# Patient Record
Sex: MALE | Race: ASIAN | NOT HISPANIC OR LATINO | Employment: FULL TIME | URBAN - METROPOLITAN AREA
[De-identification: names, ages, dates, MRNs, and addresses within clinical notes are randomized per-mention and may not be internally consistent; named-entity substitution may affect disease eponyms.]

---

## 2021-09-02 ENCOUNTER — HOSPITAL ENCOUNTER (EMERGENCY)
Facility: HOSPITAL | Age: 26
Discharge: HOME | End: 2021-09-02
Attending: EMERGENCY MEDICINE | Admitting: EMERGENCY MEDICINE
Payer: COMMERCIAL

## 2021-09-02 VITALS
WEIGHT: 225 LBS | RESPIRATION RATE: 18 BRPM | SYSTOLIC BLOOD PRESSURE: 135 MMHG | TEMPERATURE: 98 F | DIASTOLIC BLOOD PRESSURE: 93 MMHG | HEART RATE: 66 BPM | OXYGEN SATURATION: 99 %

## 2021-09-02 DIAGNOSIS — S09.93XA INJURY OF MOUTH, INITIAL ENCOUNTER: Primary | ICD-10-CM

## 2021-09-02 PROCEDURE — 99283 EMERGENCY DEPT VISIT LOW MDM: CPT

## 2021-09-02 PROCEDURE — 99282 EMERGENCY DEPT VISIT SF MDM: CPT | Performed by: EMERGENCY MEDICINE

## 2021-09-03 NOTE — ED PROVIDER NOTES
History  Chief Complaint   Patient presents with    Mouth Injury     bumped his mouth with a drill yesterday  swelling and bruising to lips and inner mouth     23-year-old male states he was using a cordless drill and the bit caught and when that happened it spun the drill around striking him in the left side of his face in the mandibular region  Patient had injury to the mucosa on the inside of his lip and cheek from his teeth  Has had some swelling to the area since that time no fevers chills nausea vomiting diarrhea no bony tenderness of the mandible no missing teeth or fractured teeth that are obvious  History provided by:  Patient and friend   used: No        None       History reviewed  No pertinent past medical history  History reviewed  No pertinent surgical history  History reviewed  No pertinent family history  I have reviewed and agree with the history as documented  E-Cigarette/Vaping    E-Cigarette Use Current Every Day User      E-Cigarette/Vaping Substances    Nicotine Yes     THC No     CBD No     Flavoring Yes     Other No     Unknown No      Social History     Tobacco Use    Smoking status: Never Smoker    Smokeless tobacco: Never Used   Vaping Use    Vaping Use: Every day    Substances: Nicotine, Flavoring   Substance Use Topics    Alcohol use: Not Currently    Drug use: Never       Review of Systems   Constitutional: Negative for activity change, chills, diaphoresis and fever  HENT: Positive for mouth sores  Negative for congestion, ear pain, nosebleeds, sore throat, trouble swallowing and voice change  Eyes: Negative for pain, discharge and redness  Respiratory: Negative for apnea, cough, choking, shortness of breath, wheezing and stridor  Cardiovascular: Negative for chest pain and palpitations  Gastrointestinal: Negative for abdominal distention, abdominal pain, constipation, diarrhea, nausea and vomiting     Endocrine: Negative for polydipsia  Genitourinary: Negative for difficulty urinating, dysuria, flank pain, frequency, hematuria and urgency  Musculoskeletal: Negative for back pain, gait problem, joint swelling, myalgias, neck pain and neck stiffness  Skin: Negative for pallor and rash  Neurological: Negative for dizziness, tremors, syncope, speech difficulty, weakness, numbness and headaches  Hematological: Negative for adenopathy  Psychiatric/Behavioral: Negative for confusion, hallucinations, self-injury and suicidal ideas  The patient is not nervous/anxious  Physical Exam  Physical Exam  Vitals and nursing note reviewed  Constitutional:       General: He is not in acute distress  Appearance: He is well-developed  He is not diaphoretic  HENT:      Head: Normocephalic and atraumatic  Right Ear: External ear normal       Left Ear: External ear normal       Nose: Nose normal       Mouth/Throat:      Comments: Patient does have some nonsuturable lacerations and abrasions to the inside of his left lower lip and cheek  Eyes:      Conjunctiva/sclera: Conjunctivae normal       Pupils: Pupils are equal, round, and reactive to light  Cardiovascular:      Rate and Rhythm: Normal rate and regular rhythm  Heart sounds: Normal heart sounds  Pulmonary:      Effort: Pulmonary effort is normal       Breath sounds: Normal breath sounds  Abdominal:      General: Bowel sounds are normal       Palpations: Abdomen is soft  Musculoskeletal:         General: Normal range of motion  Cervical back: Normal range of motion and neck supple  Skin:     General: Skin is warm and dry  Neurological:      Mental Status: He is alert and oriented to person, place, and time  Deep Tendon Reflexes: Reflexes are normal and symmetric  Psychiatric:         Behavior: Behavior is cooperative           Vital Signs  ED Triage Vitals [09/02/21 1833]   Temperature Pulse Respirations Blood Pressure SpO2   98 °F (36 7 °C) 66 18 135/93 99 %      Temp src Heart Rate Source Patient Position - Orthostatic VS BP Location FiO2 (%)   -- -- -- -- --      Pain Score       6           Vitals:    09/02/21 1833   BP: 135/93   Pulse: 66         Visual Acuity      ED Medications  Medications - No data to display    Diagnostic Studies  Results Reviewed     None                 No orders to display              Procedures  Procedures         ED Course                                           MDM    Disposition  Final diagnoses:   Injury of mouth, initial encounter     Time reflects when diagnosis was documented in both MDM as applicable and the Disposition within this note     Time User Action Codes Description Comment    9/2/2021  7:21 PM Freddy Byrd Add [C04 35EA] Injury of mouth, initial encounter       ED Disposition     ED Disposition Condition Date/Time Comment    Discharge Stable Thu Sep 2, 2021  7:21 PM Dirk Grief discharge to home/self care  Follow-up Information     Follow up With Specialties Details Why Contact Info Additional Information    [address] [facility] Emergency Department Emergency Medicine  As needed [address] [address] Emergency Department, [facility], [facility]          There are no discharge medications for this patient  No discharge procedures on file      PDMP Review     None          ED Provider  Electronically Signed by           Carri Newman DO  09/02/21 2198

## 2022-01-06 ENCOUNTER — OFFICE VISIT (OUTPATIENT)
Dept: URGENT CARE | Facility: CLINIC | Age: 27
End: 2022-01-06
Payer: COMMERCIAL

## 2022-01-06 VITALS
HEIGHT: 70 IN | BODY MASS INDEX: 28.77 KG/M2 | HEART RATE: 86 BPM | TEMPERATURE: 97.1 F | OXYGEN SATURATION: 98 % | RESPIRATION RATE: 16 BRPM | DIASTOLIC BLOOD PRESSURE: 80 MMHG | SYSTOLIC BLOOD PRESSURE: 138 MMHG | WEIGHT: 201 LBS

## 2022-01-06 DIAGNOSIS — Z56.6 OTHER PHYSICAL AND MENTAL STRAIN RELATED TO WORK: Primary | ICD-10-CM

## 2022-01-06 PROCEDURE — 90472 IMMUNIZATION ADMIN EACH ADD: CPT

## 2022-01-06 PROCEDURE — 90471 IMMUNIZATION ADMIN: CPT

## 2022-01-06 PROCEDURE — 90715 TDAP VACCINE 7 YRS/> IM: CPT

## 2022-01-06 PROCEDURE — 90682 RIV4 VACC RECOMBINANT DNA IM: CPT

## 2022-01-06 PROCEDURE — 99203 OFFICE O/P NEW LOW 30 MIN: CPT | Performed by: FAMILY MEDICINE

## 2022-01-06 SDOH — HEALTH STABILITY - MENTAL HEALTH: OTHER PHYSICAL AND MENTAL STRAIN RELATED TO WORK: Z56.6

## 2022-01-06 NOTE — PROGRESS NOTES
[facility]        NAME: Mohammed Boast is a 23 y o  male  : 1995    MRN: [id number]  DATE: 2022  TIME: 1:33 PM    Assessment and Plan   Other physical and mental strain related to work [Z56 6]  1  Other physical and mental strain related to work  influenza vaccine, quadrivalent, recombinant, PF, 0 5 mL, for patients 18 yr+ (FLUBLOK)    Tdap Vaccine greater than or equal to 6yo     Unremarkable physical exam   Given influenza vaccine and Tdap booster  Patient will obtain blood titers  Physical exam form completed and signed  Patient Instructions     Follow up with PCP in 3-5 days  Proceed to  ER if symptoms worsen  Chief Complaint     Chief Complaint   Patient presents with    Annual Exam     Annual exam         History of Present Illness       45-year-old male presents today for a physical prior to his deployment to [place]  Denies any current symptoms or medical ailments  Review of Systems   Review of Systems   Constitutional: Negative for chills and fever  HENT: Negative for congestion, rhinorrhea and sore throat  Respiratory: Negative for cough, shortness of breath and wheezing  Cardiovascular: Negative for chest pain  Gastrointestinal: Negative for abdominal pain and nausea  Musculoskeletal: Negative for arthralgias  Skin: Negative for rash  Neurological: Negative for dizziness and headaches  Current Medications     No current outpatient medications on file  Current Allergies     Allergies as of 2022    (No Known Allergies)            The following portions of the patient's history were reviewed and updated as appropriate: allergies, current medications, past family history, past medical history, past social history, past surgical history and problem list      History reviewed  No pertinent past medical history  History reviewed  No pertinent surgical history  No family history on file        Medications have been verified  Objective   /80   Pulse 86   Temp (!) 97 1 °F (36 2 °C)   Resp 16   Ht 5' 10" (1 778 m)   Wt 91 2 kg (201 lb)   SpO2 98%   BMI 28 84 kg/m²   No LMP for male patient  Physical Exam     Physical Exam  Vitals and nursing note reviewed  Constitutional:       General: He is not in acute distress  Appearance: Normal appearance  He is normal weight  He is not ill-appearing, toxic-appearing or diaphoretic  HENT:      Head: Normocephalic and atraumatic  Right Ear: Tympanic membrane, ear canal and external ear normal  There is no impacted cerumen  Left Ear: Tympanic membrane, ear canal and external ear normal  There is no impacted cerumen  Nose: Nose normal       Mouth/Throat:      Mouth: Mucous membranes are moist       Pharynx: No posterior oropharyngeal erythema  Eyes:      General:         Right eye: No discharge  Left eye: No discharge  Conjunctiva/sclera: Conjunctivae normal    Cardiovascular:      Rate and Rhythm: Normal rate and regular rhythm  Pulmonary:      Effort: Pulmonary effort is normal  No respiratory distress  Breath sounds: Normal breath sounds  No wheezing or rales  Abdominal:      General: Abdomen is flat  Tenderness: There is no abdominal tenderness  Musculoskeletal:         General: No tenderness or signs of injury  Normal range of motion  Cervical back: Normal range of motion  Rigidity present  No tenderness  Lymphadenopathy:      Cervical: No cervical adenopathy  Skin:     General: Skin is warm  Findings: No erythema  Neurological:      General: No focal deficit present  Mental Status: He is alert and oriented to person, place, and time  Cranial Nerves: No cranial nerve deficit  Sensory: No sensory deficit  Motor: No weakness        Coordination: Coordination normal       Gait: Gait normal       Deep Tendon Reflexes: Reflexes normal    Psychiatric:         Mood and Affect: Mood normal          Behavior: Behavior normal          Thought Content:  Thought content normal          Judgment: Judgment normal

## 2022-01-24 ENCOUNTER — OFFICE VISIT (OUTPATIENT)
Dept: FAMILY MEDICINE CLINIC | Facility: CLINIC | Age: 27
End: 2022-01-24

## 2022-01-24 VITALS
HEART RATE: 96 BPM | HEIGHT: 70 IN | RESPIRATION RATE: 18 BRPM | OXYGEN SATURATION: 96 % | TEMPERATURE: 97.7 F | BODY MASS INDEX: 28.72 KG/M2 | SYSTOLIC BLOOD PRESSURE: 132 MMHG | WEIGHT: 200.6 LBS | DIASTOLIC BLOOD PRESSURE: 88 MMHG

## 2022-01-24 DIAGNOSIS — Z02.1 PRE-EMPLOYMENT HEALTH SCREENING EXAMINATION: Primary | ICD-10-CM

## 2022-01-24 DIAGNOSIS — Z13.6 SCREENING FOR CARDIOVASCULAR CONDITION: ICD-10-CM

## 2022-01-24 DIAGNOSIS — Z13.818 ENCOUNTER FOR SCREENING FOR OTHER DIGESTIVE SYSTEM DISORDERS: ICD-10-CM

## 2022-01-24 PROCEDURE — 99213 OFFICE O/P EST LOW 20 MIN: CPT | Performed by: NURSE PRACTITIONER

## 2022-01-24 PROCEDURE — 93000 ELECTROCARDIOGRAM COMPLETE: CPT | Performed by: NURSE PRACTITIONER

## 2022-01-24 PROCEDURE — 3008F BODY MASS INDEX DOCD: CPT | Performed by: NURSE PRACTITIONER

## 2022-01-24 NOTE — PROGRESS NOTES
Assessment/Plan:    1  Pre-employment health screening examination  -     US abdomen limited; Future; Expected date: 01/24/2022    2  Encounter for screening for other digestive system disorders  -     US abdomen limited; Future; Expected date: 01/24/2022    3  Screening for cardiovascular condition  -     POCT ECG        EKG normal   abd us arranged for sat 1/29  Will call pt with results    There are no Patient Instructions on file for this visit  No follow-ups on file  Subjective:      Patient ID: John Ring is a 23 y o  male  Chief Complaint   Patient presents with    other     US gallbladder and EKG for employment in [place]       Patient presents for pre employment EKG  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   Required rigorous medical evaluation since tertiary medical care not available    He is required to have us of gall bladder to r/o gall stone, dz    Reports that he is in very good health  No pmh      The following portions of the patient's history were reviewed and updated as appropriate: allergies, current medications, past family history, past medical history, past social history, past surgical history and problem list     Review of Systems   Constitutional: Negative  Respiratory: Negative  Cardiovascular: Negative  Gastrointestinal: Negative  Neurological: Negative  Psychiatric/Behavioral: Negative  No current outpatient medications on file  No current facility-administered medications for this visit  Objective:    /88 (BP Location: Left arm, Patient Position: Sitting, Cuff Size: Large)   Pulse 96   Temp 97 7 °F (36 5 °C)   Resp 18   Ht 5' 10" (1 778 m)   Wt 91 kg (200 lb 9 6 oz)   SpO2 96%   BMI 28 78 kg/m²        Physical Exam  Vitals and nursing note reviewed  Constitutional:       General: He is not in acute distress  Appearance: Normal appearance  He is not ill-appearing     Cardiovascular:      Rate and Rhythm: Normal rate and regular rhythm  Pulses: Normal pulses  Heart sounds: Normal heart sounds  Pulmonary:      Effort: Pulmonary effort is normal       Breath sounds: Normal breath sounds  Abdominal:      General: Bowel sounds are normal       Palpations: Abdomen is soft  Tenderness: There is no abdominal tenderness  Negative signs include Hoff's sign and McBurney's sign  Neurological:      General: No focal deficit present  Mental Status: He is alert  Psychiatric:         Mood and Affect: Mood normal             EKG: normal EKG, normal sinus rhythm, there are no previous tracings available for comparison        Georgianna Goodell, [place]

## 2022-01-29 ENCOUNTER — HOSPITAL ENCOUNTER (OUTPATIENT)
Dept: ULTRASOUND IMAGING | Facility: HOSPITAL | Age: 27
Discharge: HOME | End: 2022-01-29
Payer: COMMERCIAL

## 2022-01-29 DIAGNOSIS — Z13.818 ENCOUNTER FOR SCREENING FOR OTHER DIGESTIVE SYSTEM DISORDERS: ICD-10-CM

## 2022-01-29 DIAGNOSIS — Z02.1 PRE-EMPLOYMENT HEALTH SCREENING EXAMINATION: ICD-10-CM

## 2022-01-29 PROCEDURE — 76705 ECHO EXAM OF ABDOMEN: CPT

## 2023-10-13 ENCOUNTER — OFFICE VISIT (OUTPATIENT)
Dept: URGENT CARE | Facility: CLINIC | Age: 28
End: 2023-10-13
Payer: COMMERCIAL

## 2023-10-13 VITALS
HEART RATE: 98 BPM | DIASTOLIC BLOOD PRESSURE: 100 MMHG | TEMPERATURE: 97.8 F | WEIGHT: 237.8 LBS | OXYGEN SATURATION: 97 % | HEIGHT: 70 IN | SYSTOLIC BLOOD PRESSURE: 150 MMHG | BODY MASS INDEX: 34.04 KG/M2 | RESPIRATION RATE: 18 BRPM

## 2023-10-13 DIAGNOSIS — J02.9 SORE THROAT: Primary | ICD-10-CM

## 2023-10-13 DIAGNOSIS — K42.9 UMBILICAL HERNIA WITHOUT OBSTRUCTION OR GANGRENE: ICD-10-CM

## 2023-10-13 LAB — S PYO AG THROAT QL: NEGATIVE

## 2023-10-13 PROCEDURE — 99213 OFFICE O/P EST LOW 20 MIN: CPT | Performed by: FAMILY MEDICINE

## 2023-10-13 PROCEDURE — 87147 CULTURE TYPE IMMUNOLOGIC: CPT | Performed by: FAMILY MEDICINE

## 2023-10-13 PROCEDURE — 87070 CULTURE OTHR SPECIMN AEROBIC: CPT | Performed by: FAMILY MEDICINE

## 2023-10-13 PROCEDURE — 87880 STREP A ASSAY W/OPTIC: CPT | Performed by: FAMILY MEDICINE

## 2023-10-13 RX ORDER — FLUTICASONE PROPIONATE 50 MCG
1 SPRAY, SUSPENSION (ML) NASAL DAILY
Qty: 16 G | Refills: 0 | Status: SHIPPED | OUTPATIENT
Start: 2023-10-13

## 2023-10-13 RX ORDER — PENICILLIN V POTASSIUM 500 MG/1
500 TABLET ORAL EVERY 12 HOURS
Qty: 20 TABLET | Refills: 0 | Status: SHIPPED | OUTPATIENT
Start: 2023-10-13 | End: 2023-10-23

## 2023-10-13 NOTE — PROGRESS NOTES
[facility]        NAME: Sylvester Rich is a 33 y.o. male  : 1995    MRN: [id number]  DATE: 2023  TIME: 9:52 AM    Assessment and Plan   Sore throat [J02.9]  1. Sore throat  POCT rapid strepA    Throat culture    penicillin V potassium (VEETID) 500 mg tablet    fluticasone (FLONASE) 50 mcg/act nasal spray      2. Umbilical hernia without obstruction or gangrene  Ambulatory Referral to General Surgery        Negative rapid strep. We will send a throat culture and preemptively treat for strep pharyngitis based on clinical findings. Flonase prescribed to counteract rhinitis and possible postnasal drip. Advised on supportive measures including gargling with warm salt water and hydrating with plenty fluids. Should avoid cold fluids. Small reducible umbilical hernia. Referred to general surgery for further evaluation and management. Patient Instructions     Follow up with PCP in 3-5 days. Proceed to  ER if symptoms worsen. Chief Complaint     Chief Complaint   Patient presents with    Cold Like Symptoms    Sore Throat     X 3 days - sore throat with swollen glands, sl. Nasal congestion/PND with cough. ? Fever several days ago with sl. HA. Taking Tylenol PM and NSS gargles. History of Present Illness     41-year-old male presents today with 3 days of sore throat and odynophagia associated with nasal symptoms, a slight cough and a mild headache. Denies any obvious sick contacts. Has been gargling with warm salt water and taking Tylenol PM.  Also reports feeling a mass in the umbilicus for about 3 months. Review of Systems   Review of Systems   HENT:  Positive for congestion, postnasal drip, sore throat and trouble swallowing. Respiratory:  Positive for cough. Gastrointestinal:  Negative for diarrhea, nausea and vomiting. Neurological:  Positive for headaches.      Current Medications       Current Outpatient Medications:     fluticasone (FLONASE) 50 mcg/act nasal spray, 1 spray into each nostril daily, Disp: 16 g, Rfl: 0    Multiple Vitamin (MULTIVITAMIN ADULT PO), Take by mouth daily after breakfast, Disp: , Rfl:     Omega-3 Fatty Acids (FISH OIL OMEGA-3 PO), Take by mouth daily after breakfast, Disp: , Rfl:     penicillin V potassium (VEETID) 500 mg tablet, Take 1 tablet (500 mg total) by mouth every 12 (twelve) hours for 10 days, Disp: 20 tablet, Rfl: 0    Current Allergies     Allergies as of 10/13/2023    (No Known Allergies)            The following portions of the patient's history were reviewed and updated as appropriate: allergies, current medications, past family history, past medical history, past social history, past surgical history and problem list.     History reviewed. No pertinent past medical history. Past Surgical History:   Procedure Laterality Date    FRACTURE SURGERY Left     hand       History reviewed. No pertinent family history. Medications have been verified. Objective   /100   Pulse 98   Temp 97.8 °F (36.6 °C)   Resp 18   Ht 5' 10" (1.778 m)   Wt 108 kg (237 lb 12.8 oz)   SpO2 97%   BMI 34.12 kg/m²   No LMP for male patient. Physical Exam     Physical Exam  Vitals and nursing note reviewed. Constitutional:       General: He is in acute distress. Appearance: He is well-developed. He is not ill-appearing, toxic-appearing or diaphoretic. HENT:      Head: Normocephalic and atraumatic. Mouth/Throat:      Mouth: Mucous membranes are moist.      Pharynx: Uvula midline. Posterior oropharyngeal erythema present. Tonsils: Tonsillar exudate present. No tonsillar abscesses. 2+ on the right. 2+ on the left. Pulmonary:      Effort: Pulmonary effort is normal.   Abdominal:      Palpations: Abdomen is soft. There is mass. Tenderness: There is no abdominal tenderness.       Hernia: A hernia (Small reducible mass in the umbilicus; ventral wall defect a little less than 1 cm in diameter) is present. Skin:     General: Skin is warm. Findings: No erythema. Neurological:      General: No focal deficit present. Mental Status: He is alert and oriented to person, place, and time.    Psychiatric:         Mood and Affect: Mood normal.         Behavior: Behavior normal.

## 2023-10-14 ENCOUNTER — HOSPITAL ENCOUNTER (EMERGENCY)
Facility: HOSPITAL | Age: 28
Discharge: HOME | End: 2023-10-14
Attending: EMERGENCY MEDICINE
Payer: COMMERCIAL

## 2023-10-14 ENCOUNTER — APPOINTMENT (EMERGENCY)
Dept: RADIOLOGY | Facility: HOSPITAL | Age: 28
End: 2023-10-14
Payer: COMMERCIAL

## 2023-10-14 VITALS
HEART RATE: 88 BPM | TEMPERATURE: 98.2 F | OXYGEN SATURATION: 96 % | DIASTOLIC BLOOD PRESSURE: 90 MMHG | SYSTOLIC BLOOD PRESSURE: 140 MMHG | RESPIRATION RATE: 18 BRPM

## 2023-10-14 DIAGNOSIS — J03.90 ACUTE TONSILLITIS: Primary | ICD-10-CM

## 2023-10-14 DIAGNOSIS — J36 TONSILLAR ABSCESS: ICD-10-CM

## 2023-10-14 LAB
ANION GAP SERPL CALCULATED.3IONS-SCNC: 8 MMOL/L
BASOPHILS # BLD AUTO: 0.03 THOUSANDS/ÂΜL (ref 0–0.1)
BASOPHILS NFR BLD AUTO: 0 % (ref 0–1)
BUN SERPL-MCNC: 12 MG/DL (ref 5–25)
CALCIUM SERPL-MCNC: 9.2 MG/DL (ref 8.4–10.2)
CHLORIDE SERPL-SCNC: 100 MMOL/L (ref 96–108)
CO2 SERPL-SCNC: 26 MMOL/L (ref 21–32)
CREAT SERPL-MCNC: 1.2 MG/DL (ref 0.6–1.3)
EOSINOPHIL # BLD AUTO: 0.36 THOUSAND/ÂΜL (ref 0–0.61)
EOSINOPHIL NFR BLD AUTO: 4 % (ref 0–6)
ERYTHROCYTE [DISTWIDTH] IN BLOOD BY AUTOMATED COUNT: 12.9 % (ref 11.6–15.1)
GFR SERPL CREATININE-BSD FRML MDRD: 82 ML/MIN/1.73SQ M
GLUCOSE SERPL-MCNC: 118 MG/DL (ref 65–140)
HCT VFR BLD AUTO: 49 % (ref 36.5–49.3)
HETEROPH AB SER QL: NEGATIVE
HGB BLD-MCNC: 17.3 G/DL (ref 12–17)
IMM GRANULOCYTES # BLD AUTO: 0.03 THOUSAND/UL (ref 0–0.2)
IMM GRANULOCYTES NFR BLD AUTO: 0 % (ref 0–2)
LYMPHOCYTES # BLD AUTO: 2.16 THOUSANDS/ÂΜL (ref 0.6–4.47)
LYMPHOCYTES NFR BLD AUTO: 23 % (ref 14–44)
MCH RBC QN AUTO: 31.3 PG (ref 26.8–34.3)
MCHC RBC AUTO-ENTMCNC: 35.3 G/DL (ref 31.4–37.4)
MCV RBC AUTO: 89 FL (ref 82–98)
MONOCYTES # BLD AUTO: 1.02 THOUSAND/ÂΜL (ref 0.17–1.22)
MONOCYTES NFR BLD AUTO: 11 % (ref 4–12)
NEUTROPHILS # BLD AUTO: 6 THOUSANDS/ÂΜL (ref 1.85–7.62)
NEUTS SEG NFR BLD AUTO: 62 % (ref 43–75)
NRBC BLD AUTO-RTO: 0 /100 WBCS
PLATELET # BLD AUTO: 240 THOUSANDS/UL (ref 149–390)
PMV BLD AUTO: 10.7 FL (ref 8.9–12.7)
POTASSIUM SERPL-SCNC: 3.5 MMOL/L (ref 3.5–5.3)
RBC # BLD AUTO: 5.52 MILLION/UL (ref 3.88–5.62)
S PYO DNA THROAT QL NAA+PROBE: NOT DETECTED
SODIUM SERPL-SCNC: 134 MMOL/L (ref 135–147)
WBC # BLD AUTO: 9.6 THOUSAND/UL (ref 4.31–10.16)

## 2023-10-14 PROCEDURE — 36415 COLL VENOUS BLD VENIPUNCTURE: CPT | Performed by: EMERGENCY MEDICINE

## 2023-10-14 PROCEDURE — 80048 BASIC METABOLIC PNL TOTAL CA: CPT | Performed by: EMERGENCY MEDICINE

## 2023-10-14 PROCEDURE — 99285 EMERGENCY DEPT VISIT HI MDM: CPT | Performed by: EMERGENCY MEDICINE

## 2023-10-14 PROCEDURE — 85025 COMPLETE CBC W/AUTO DIFF WBC: CPT | Performed by: EMERGENCY MEDICINE

## 2023-10-14 PROCEDURE — 87651 STREP A DNA AMP PROBE: CPT | Performed by: EMERGENCY MEDICINE

## 2023-10-14 PROCEDURE — 70491 CT SOFT TISSUE NECK W/DYE: CPT

## 2023-10-14 PROCEDURE — 86308 HETEROPHILE ANTIBODY SCREEN: CPT | Performed by: EMERGENCY MEDICINE

## 2023-10-14 RX ORDER — AMOXICILLIN AND CLAVULANATE POTASSIUM 875; 125 MG/1; MG/1
1 TABLET, FILM COATED ORAL EVERY 12 HOURS SCHEDULED
Qty: 14 TABLET | Refills: 0 | Status: SHIPPED | OUTPATIENT
Start: 2023-10-14 | End: 2023-10-21

## 2023-10-14 RX ORDER — HYDROMORPHONE HCL/PF 1 MG/ML
1 SYRINGE (ML) INJECTION ONCE
Status: COMPLETED | OUTPATIENT
Start: 2023-10-14 | End: 2023-10-14

## 2023-10-14 RX ORDER — KETOROLAC TROMETHAMINE 30 MG/ML
15 INJECTION, SOLUTION INTRAMUSCULAR; INTRAVENOUS ONCE
Status: COMPLETED | OUTPATIENT
Start: 2023-10-14 | End: 2023-10-14

## 2023-10-14 RX ORDER — FAMOTIDINE 20 MG/1
20 TABLET, FILM COATED ORAL 2 TIMES DAILY
Qty: 10 TABLET | Refills: 0 | Status: SHIPPED | OUTPATIENT
Start: 2023-10-14 | End: 2023-10-19

## 2023-10-14 RX ORDER — METHYLPREDNISOLONE SODIUM SUCCINATE 125 MG/2ML
125 INJECTION, POWDER, LYOPHILIZED, FOR SOLUTION INTRAMUSCULAR; INTRAVENOUS ONCE
Status: COMPLETED | OUTPATIENT
Start: 2023-10-14 | End: 2023-10-14

## 2023-10-14 RX ORDER — NAPROXEN 500 MG/1
500 TABLET ORAL 2 TIMES DAILY WITH MEALS
Qty: 30 TABLET | Refills: 0 | Status: SHIPPED | OUTPATIENT
Start: 2023-10-14 | End: 2023-10-19

## 2023-10-14 RX ORDER — DEXAMETHASONE 4 MG/1
4 TABLET ORAL 2 TIMES DAILY WITH MEALS
Qty: 8 TABLET | Refills: 0 | Status: SHIPPED | OUTPATIENT
Start: 2023-10-14 | End: 2023-10-18

## 2023-10-14 RX ADMIN — SODIUM CHLORIDE 1000 ML: 0.9 INJECTION, SOLUTION INTRAVENOUS at 04:45

## 2023-10-14 RX ADMIN — KETOROLAC TROMETHAMINE 15 MG: 30 INJECTION, SOLUTION INTRAMUSCULAR at 04:45

## 2023-10-14 RX ADMIN — HYDROMORPHONE HYDROCHLORIDE 1 MG: 1 INJECTION, SOLUTION INTRAMUSCULAR; INTRAVENOUS; SUBCUTANEOUS at 06:40

## 2023-10-14 RX ADMIN — METHYLPREDNISOLONE SODIUM SUCCINATE 125 MG: 125 INJECTION, POWDER, FOR SOLUTION INTRAMUSCULAR; INTRAVENOUS at 04:44

## 2023-10-14 RX ADMIN — RACEPINEPHRINE HYDROCHLORIDE 0.5 ML: 11.25 SOLUTION RESPIRATORY (INHALATION) at 04:43

## 2023-10-14 RX ADMIN — SODIUM CHLORIDE 3 G: 9 INJECTION, SOLUTION INTRAVENOUS at 05:31

## 2023-10-14 RX ADMIN — IOHEXOL 100 ML: 350 INJECTION, SOLUTION INTRAVENOUS at 05:07

## 2023-10-14 NOTE — DISCHARGE INSTRUCTIONS
2. Incidentally identified 2.6 x 2.3 cm fluid attenuation structure in the superior mediastinum as described above. Nonemergent cardio thoracic surgical consultation suggested.

## 2023-10-14 NOTE — ED PROVIDER NOTES
Final Diagnosis:  1. Acute tonsillitis    2. Tonsillar abscess        Chief Complaint   Patient presents with    Sore Throat - Complicated     Pt arrives from home, sick over last few days with sore throat and cold symptoms, seen at urgent care and tested for strep and viral illnesses, came back negative, woke tonight because he felt like his throat was very swollen, tonsils enlarged with white patches at time of triage, pt voice altered at time of triage. Prescribed penicillin and nasal spray in office. HPI  Patient presents w/ vocal fullness and sob. He's had a few d of sore throat. He has giant tonsils w/ exudate. They are "kissing". Also w/ cough though. Had negative strep rapid at [facility]. Given penicillin and has had 2 doses. No stridor, not tripoding, but significant hot potatoe voice. Iv established, IV abx and sent straight to CT. Give racemic epi off label for potential in d/c swelling but more importantly IV solumedrol. Has improvement during his 3 hr stay here. Toradol and dilaudid for the discomfort of it    Voice better, breathing better, feels better. EMS reports if applicable: N/A     - Previous charting underwent limited review with attention to last ED visits, labs, ekgs, and prior imaging. Chart review reveals :     Office Visit on 10/13/2023   Component Date Value Ref Range Status     RAPID STREP A 10/13/2023 Negative  Negative Final    Throat Culture 10/13/2023 Culture results to follow. Preliminary       - No language barrier.   - History obtained from patient . - There are n limitations to the history obtained. - Discuss patient's care, with patient permission or by chart review, with      PMH:   has no past medical history on file. PSH:   has a past surgical history that includes Fracture surgery (Left).      Social History:  Tobacco Use: Low Risk  (10/14/2023)    Patient History     Smoking Tobacco Use: Never     Smokeless Tobacco Use: Never     Passive Exposure: Not on file     Alcohol Use: Not on file     No illicit use       ROS:  Pertinent positives/negatives: Kendrick Lennox Some ROS may be present in the HPI and would take precedent over these standard questions asked below. Review of Systems   HENT:  Positive for sore throat, trouble swallowing and voice change. Respiratory:  Positive for cough and shortness of breath. CONSTITUTIONAL:  No lethargy. No weakness. No unexpected weight loss. No appetite change. EYES:  No pain, redness, or discharge. No loss of vision. No orbital trauma or pain. ENT:  No tinnitus or decreased hearing. No epistaxis/purulent rhinorrhea. No trismus. CARDIOVASCULAR:  No chest pain. No skin mottling or pallor. No change in exertional capacity  RESPIRATORY:  No hemoptysis. No paroxysmal nocturnal dyspnea. No stridor. No audible wheezing. No production with cough. GASTROINTESTINAL:  Normal appetite. No vomiting, diarrhea. No pain. No bloating. No melena. No hematochezia. GENITOURINARY:  No frequency, urgency, nocturia. No hematuria or dysuria. No discharge. No sores/adenopathy. MUSCULOSKELETAL:  No arthralgias or myalgias that are new. No new deformity. INTEGUMENTARY:  No swelling. No unexpected contusions. No abrasions. No lymphangitis. NEUROLOGIC:  No meningismus. No new numbness of the extremities. No new focal weakness. No postural instability  PSYCHIATRIC:  No SI HI AVH  HEMATOLOGICAL:  No bleeding. No petechiae. No bruising. ALLERGIES:  No urticaria. No sudden abd cramping. No stridor. PE:     Physical exam highlights:   Physical Exam       Vitals:    10/14/23 0428 10/14/23 0430 10/14/23 0600 10/14/23 0615   BP: 164/91 164/91 140/90    BP Location:  Left arm     Pulse: 98 98 88 88   Resp:  19 18    Temp:       TempSrc:       SpO2:  94% 92% 96%     Vitals reviewed by me. Nursing note reviewed  Chaperone present for all sensitive exam.  Const: No acute distress. Alert. Nontoxic. Not diaphoretic. HEENT: kissing tonsils. Exudate. No uvula deviation. Doesn't examine like peritonsillar abscess in it's symmetry. Eyes: No squinting. No icterus. No tearing/swelling/drainage. Tracks through the room with normal EOM. Neck: ROM normal. No rigidity. No meningismus. Cards: Rate as per vitals Compared to monitor sinus unless documented. Regular Well perfused. Pulm: able to verbalize without additional effort. Effort and excursion normal. No distress. No audible wheezing/ stridor. Normal resp rate without retraction or change in work of breathing. Abd: No distension beyond baseline. No fluctuant wave. Patient without peritoneal pain with shifting/bumping the bed. MSK: ROM normal baseline. No deformity. No contractures from baseline. Skin: small erythamtous sandpaper rash thighs Well perfused. No wounds visualized on exposed skin  Neuro: Nonfocal. Baseline. CN grossly intact. Moving all four with coordination. Psych: Normal behavior and affect. A:  - Nursing note reviewed. Ddx and MDM  Considered diagnoses  R/o bilateral peritonsillar abscess  CT neck w  Has intratonsillar abscess - no drain    Swelling  Airway watch  Watch > 2 hours  Solumedrol  Toradol  Dilaudid  Racemic epi  Feels way better sounds way clearer    Unasyn then augmentin    R/o mono  Given precautions  Did get rash after penicillin    Could just be strep rash or viral exanthem          My conversation with consultant reveals: NA       Decision rules:                           My read of the XR/CT scan reveals:  NA   CT soft tissue neck with contrast   Final Result         1. Symmetrically moderately enlarged [place] tonsils consistent with tonsillitis with small tonsillar abscesses. There is concomitant moderate narrowing of the oropharynx. There is also some enlargement of the posterior soft palate/uvula as well as the    adenoids. 2. Incidentally identified 2.6 x 2.3 cm fluid attenuation structure in the superior mediastinum as described above. Nonemergent cardio thoracic surgical consultation suggested. 3. Additional findings as noted. The study was marked in [facility] for immediate notification.             Workstation performed: [id number]             Orders Placed This Encounter   Procedures    Strep A PCR    CT soft tissue neck with contrast    CBC and differential    Basic metabolic panel    Mononucleosis screen     Labs Reviewed   CBC AND DIFFERENTIAL - Abnormal       Result Value Ref Range Status    WBC 9.60  4.31 - 10.16 Thousand/uL Final    RBC 5.52  3.88 - 5.62 Million/uL Final    Hemoglobin 17.3 (*) 12.0 - 17.0 g/dL Final    Hematocrit 49.0  36.5 - 49.3 % Final    MCV 89  82 - 98 fL Final    MCH 31.3  26.8 - 34.3 pg Final    MCHC 35.3  31.4 - 37.4 g/dL Final    RDW 12.9  11.6 - 15.1 % Final    MPV 10.7  8.9 - 12.7 fL Final    Platelets 284  763 - 390 Thousands/uL Final    nRBC 0  /100 WBCs Final    Neutrophils Relative 62  43 - 75 % Final    Immat GRANS % 0  0 - 2 % Final    Lymphocytes Relative 23  14 - 44 % Final    Monocytes Relative 11  4 - 12 % Final    Eosinophils Relative 4  0 - 6 % Final    Basophils Relative 0  0 - 1 % Final    Neutrophils Absolute 6.00  1.85 - 7.62 Thousands/µL Final    Immature Grans Absolute 0.03  0.00 - 0.20 Thousand/uL Final    Lymphocytes Absolute 2.16  0.60 - 4.47 Thousands/µL Final    Monocytes Absolute 1.02  0.17 - 1.22 Thousand/µL Final    Eosinophils Absolute 0.36  0.00 - 0.61 Thousand/µL Final    Basophils Absolute 0.03  0.00 - 0.10 Thousands/µL Final   BASIC METABOLIC PANEL - Abnormal    Sodium 134 (*) 135 - 147 mmol/L Final    Potassium 3.5  3.5 - 5.3 mmol/L Final    Chloride 100  96 - 108 mmol/L Final    CO2 26  21 - 32 mmol/L Final    ANION GAP 8  mmol/L Final    BUN 12  5 - 25 mg/dL Final    Creatinine 1.20  0.60 - 1.30 mg/dL Final    Comment: Standardized to IDMS reference method    Glucose 118  65 - 140 mg/dL Final    Comment: If the patient is fasting, the ADA then defines impaired fasting glucose as > 100 mg/dL and diabetes as > or equal to 123 mg/dL. Calcium 9.2  8.4 - 10.2 mg/dL Final    eGFR 82  ml/min/1.73sq m Final    Narrative:     [place] guidelines for Chronic Kidney Disease (CKD):     Stage 1 with normal or high GFR (GFR > 90 mL/min/1.73 square meters)    Stage 2 Mild CKD (GFR = 60-89 mL/min/1.73 square meters)    Stage 3A Moderate CKD (GFR = 45-59 mL/min/1.73 square meters)    Stage 3B Moderate CKD (GFR = 30-44 mL/min/1.73 square meters)    Stage 4 Severe CKD (GFR = 15-29 mL/min/1.73 square meters)    Stage 5 End Stage CKD (GFR <15 mL/min/1.73 square meters)  Note: GFR calculation is accurate only with a steady state creatinine   STREP A PCR - Normal    STREP A PCR Not Detected  Not Detected Final       *Each of these labs was reviewed. Particular standout labs will be noted in the ED Course above     Final Diagnosis:  1. Acute tonsillitis    2.  Tonsillar abscess          P:  - hospital tx includes   Medications   sodium chloride 0.9 % bolus 1,000 mL (0 mL Intravenous Stopped 10/14/23 0645)   methylPREDNISolone sodium succinate (Solu-MEDROL) injection 125 mg (125 mg Intravenous Given 10/14/23 0444)   ketorolac (TORADOL) injection 15 mg (15 mg Intravenous Given 10/14/23 0445)   racepinephrine 2.25 % inhalation solution 0.5 mL (0.5 mL Nebulization Given 10/14/23 0443)   ampicillin-sulbactam (UNASYN) 3 g in sodium chloride 0.9 % 100 mL IVPB (0 g Intravenous Stopped 10/14/23 0601)   iohexol (OMNIPAQUE) 350 MG/ML injection (MULTI-DOSE) 100 mL (100 mL Intravenous Given 10/14/23 0507)   HYDROmorphone (DILAUDID) injection 1 mg (1 mg Intravenous Given 10/14/23 0640)         - disposition  Time reflects when diagnosis was documented in both MDM as applicable and the Disposition within this note       Time User Action Codes Description Comment    10/14/2023  6:21 AM Isidro Harrison [J03.90] Acute tonsillitis     10/14/2023  6:21 AM Isidro Harrison [J36] Tonsillar abscess     10/14/2023  6:21 AM Michael Hadley Modify [J36] Tonsillar abscess bilateral    10/14/2023  6:21 AM Michael Hadley Modify [J03.90] Acute tonsillitis bilateral          ED Disposition       ED Disposition   Discharge    Condition   Stable    Date/Time   Sat Oct 14, 2023  6:21 AM    Comment   Wendycorina Goldsteinock discharge to home/self care. Follow-up Information       Follow up With Specialties Details Why Contact Info Additional [address], MD Otolaryngology   [address] [place] [address] [phone]       Imani Kidd MD Otolaryngology, Plastic Surgery   [address] 17[address] [facility] [address] 15[address] [facility] [place] [facility]   [facility] [address]  [address] [facility] [place], [address] [place] [address], [address] [phone]            - patient will call their PCP to let them know they were in the emergency department. We discuss return precautions and patient is agreeable with plan and aformentioned disposition.        - additional treatment intended, if consistent with primary provider:  - patient to follow with :      Discharge Medication List as of 10/14/2023  6:26 AM        START taking these medications    Details   amoxicillin-clavulanate (AUGMENTIN) 875-125 mg per tablet Take 1 tablet by mouth every 12 (twelve) hours for 7 days, Starting Sat 10/14/2023, Until Sat 10/21/2023, Normal      dexamethasone (DECADRON) 4 mg tablet Take 1 tablet (4 mg total) by mouth 2 (two) times a day with meals for 4 days, Starting Sat 10/14/2023, Until Wed 10/18/2023, Normal      famotidine (PEPCID) 20 mg tablet Take 1 tablet (20 mg total) by mouth 2 (two) times a day for 5 days, Starting Sat 10/14/2023, Until Thu 10/19/2023, Normal      menthol-cetylpyridinium (CEPACOL) 3 MG lozenge Take 1 lozenge (3 mg total) by mouth as needed for sore throat, Starting Sat 10/14/2023, Normal      naproxen (Naprosyn) 500 mg tablet Take 1 tablet (500 mg total) by mouth 2 (two) times a day with meals for 5 days, Starting Sat 10/14/2023, Until Thu 10/19/2023, Normal           CONTINUE these medications which have NOT CHANGED    Details   fluticasone (FLONASE) 50 mcg/act nasal spray 1 spray into each nostril daily, Starting Fri 10/13/2023, Normal      Multiple Vitamin (MULTIVITAMIN ADULT PO) Take by mouth daily after breakfast, Historical Med      Omega-3 Fatty Acids (FISH OIL OMEGA-3 PO) Take by mouth daily after breakfast, Historical Med      penicillin V potassium (VEETID) 500 mg tablet Take 1 tablet (500 mg total) by mouth every 12 (twelve) hours for 10 days, Starting Fri 10/13/2023, Until Mon 10/23/2023, Normal           No discharge procedures on file. Prior to Admission Medications   Prescriptions Last Dose Informant Patient Reported? Taking? Multiple Vitamin (MULTIVITAMIN ADULT PO)   Yes No   Sig: Take by mouth daily after breakfast   Omega-3 Fatty Acids (FISH OIL OMEGA-3 PO)   Yes No   Sig: Take by mouth daily after breakfast   fluticasone (FLONASE) 50 mcg/act nasal spray   No No   Si spray into each nostril daily   penicillin V potassium (VEETID) 500 mg tablet   No No   Sig: Take 1 tablet (500 mg total) by mouth every 12 (twelve) hours for 10 days      Facility-Administered Medications: None       Portions of the record may have been created with voice recognition software. Occasional wrong word or "sound a like" substitutions may have occurred due to the inherent limitations of voice recognition software. Read the chart carefully and recognize, using context, where substitutions have occurred.     Electronically signed by:  MD Luciano Nicole MD  10/16/23 9805

## 2023-10-15 ENCOUNTER — HOSPITAL ENCOUNTER (EMERGENCY)
Facility: HOSPITAL | Age: 28
Discharge: HOME | End: 2023-10-15
Attending: EMERGENCY MEDICINE
Payer: COMMERCIAL

## 2023-10-15 VITALS
DIASTOLIC BLOOD PRESSURE: 93 MMHG | TEMPERATURE: 97.5 F | OXYGEN SATURATION: 98 % | SYSTOLIC BLOOD PRESSURE: 151 MMHG | HEART RATE: 94 BPM | RESPIRATION RATE: 20 BRPM

## 2023-10-15 DIAGNOSIS — J03.90 TONSILLITIS: Primary | ICD-10-CM

## 2023-10-15 LAB — BACTERIA THROAT CULT: NORMAL

## 2023-10-15 RX ORDER — KETOROLAC TROMETHAMINE 30 MG/ML
15 INJECTION, SOLUTION INTRAMUSCULAR; INTRAVENOUS ONCE
Status: COMPLETED | OUTPATIENT
Start: 2023-10-15 | End: 2023-10-15

## 2023-10-15 RX ORDER — HYDROCODONE POLISTIREX AND CHLORPHENIRAMINE POLISTIREX 10; 8 MG/5ML; MG/5ML
5 SUSPENSION, EXTENDED RELEASE ORAL EVERY 12 HOURS PRN
Qty: 120 ML | Refills: 0 | Status: SHIPPED | OUTPATIENT
Start: 2023-10-15 | End: 2023-10-25

## 2023-10-15 RX ADMIN — KETOROLAC TROMETHAMINE 15 MG: 30 INJECTION, SOLUTION INTRAMUSCULAR at 09:27

## 2023-10-15 NOTE — ED PROVIDER NOTES
History  Chief Complaint   Patient presents with    Sore Throat     Pt reports of sore throat with diff swallowing worsening for 2 days     32 y/o male presenting for throat pain. Has been seen at urgent care and yesteryda here for similar. Relays he felt better with medication given in the ED hwoever woke up this morning with worsening cough and pain. Has not had worsening swallowing. He is able to swallow without difficulty, no drooling. Was able to take medications and eat breakfast without difficulty. He is on day 2 of antibiotics, not quite 48 hours as of yet. He has been sick for approximately 4 to 5 days. He started with cough and nasal congestion accompanied with a sore throat. Has large tonsils at baseline however does not have a history of mono or strep. Had thorough work-up yesterday. Patient otherwise is now feeling well without any other complaints. He was told to return to the ED for any worsening pain. CT scan shows:  "SUPRAHYOID NECK: Oral cavity, tongue base and epiglottis appear unremarkable. Moderately symmetrically enlarged palatine tonsils are identified contacting each other in the midline, with peripherally enhancing small low-attenuation lesions present in the   medial aspects of each tonsil measuring up to 7 mm on the right and 12 mm on the left. Findings consistent with acute tonsillitis with small peritonsillar abscesses. Moderate narrowing of the oropharynx is identified. There may be some mild thickening   of the soft palate and uvula."          Prior to Admission Medications   Prescriptions Last Dose Informant Patient Reported? Taking?    Multiple Vitamin (MULTIVITAMIN ADULT PO)   Yes No   Sig: Take by mouth daily after breakfast   Omega-3 Fatty Acids (FISH OIL OMEGA-3 PO)   Yes No   Sig: Take by mouth daily after breakfast   amoxicillin-clavulanate (AUGMENTIN) 875-125 mg per tablet   No No   Sig: Take 1 tablet by mouth every 12 (twelve) hours for 7 days   dexamethasone (DECADRON) 4 mg tablet   No No   Sig: Take 1 tablet (4 mg total) by mouth 2 (two) times a day with meals for 4 days   famotidine (PEPCID) 20 mg tablet   No No   Sig: Take 1 tablet (20 mg total) by mouth 2 (two) times a day for 5 days   fluticasone (FLONASE) 50 mcg/act nasal spray   No No   Si spray into each nostril daily   menthol-cetylpyridinium (CEPACOL) 3 MG lozenge   No No   Sig: Take 1 lozenge (3 mg total) by mouth as needed for sore throat   naproxen (Naprosyn) 500 mg tablet   No No   Sig: Take 1 tablet (500 mg total) by mouth 2 (two) times a day with meals for 5 days   penicillin V potassium (VEETID) 500 mg tablet   No No   Sig: Take 1 tablet (500 mg total) by mouth every 12 (twelve) hours for 10 days      Facility-Administered Medications: None       No past medical history on file. Past Surgical History:   Procedure Laterality Date    FRACTURE SURGERY Left     hand       No family history on file. I have reviewed and agree with the history as documented. E-Cigarette/Vaping    E-Cigarette Use Current Every Day User      E-Cigarette/Vaping Substances    Nicotine Yes     THC No     CBD No     Flavoring Yes     Other No     Unknown No      Social History     Tobacco Use    Smoking status: Never    Smokeless tobacco: Never   Vaping Use    Vaping Use: Every day    Substances: Nicotine, Flavoring   Substance Use Topics    Alcohol use: Not Currently    Drug use: Never       Review of Systems   Constitutional: Negative. Negative for chills, fatigue and fever. HENT:  Positive for sore throat and voice change. Negative for congestion, postnasal drip, rhinorrhea and trouble swallowing. Eyes: Negative. Respiratory:  Positive for cough. Negative for shortness of breath and wheezing. Cardiovascular: Negative. Gastrointestinal: Negative. Negative for abdominal pain, diarrhea, nausea and vomiting. Endocrine: Negative. Genitourinary: Negative. Musculoskeletal: Negative. Skin: Negative. Neurological: Negative. Hematological: Negative. Psychiatric/Behavioral: Negative. All other systems reviewed and are negative. Physical Exam  Physical Exam  Vitals and nursing note reviewed. Constitutional:       General: He is not in acute distress. Appearance: Normal appearance. He is well-developed and normal weight. He is not diaphoretic. HENT:      Head: Normocephalic and atraumatic. Right Ear: Tympanic membrane, ear canal and external ear normal.      Left Ear: Tympanic membrane, ear canal and external ear normal.      Nose: Nose normal.      Mouth/Throat:      Pharynx: Oropharyngeal exudate and posterior oropharyngeal erythema present. Comments: Moderate swelling noted to the bilateral tonsillar region without asymmetry is noted there is no uvula deviation no peritonsillar swelling noted. Bilateral tonsillar exudates. Patient resting comfortably no distress speaking full sentences without issues, full sounding voice. Eyes:      General: No scleral icterus. Right eye: No discharge. Left eye: No discharge. Conjunctiva/sclera: Conjunctivae normal.      Pupils: Pupils are equal, round, and reactive to light. Neck:      Comments: Bilateral anterior cervical adenopathy, right more so than the left there is no posterior cervical lymphadenopathy noted. Cardiovascular:      Rate and Rhythm: Normal rate and regular rhythm. Pulses: Normal pulses. Heart sounds: Normal heart sounds. No murmur heard. No friction rub. No gallop. Pulmonary:      Effort: Pulmonary effort is normal. No respiratory distress. Breath sounds: Normal breath sounds. No stridor. No wheezing, rhonchi or rales. Comments: Dry cough noted on exam.  Chest:      Chest wall: No tenderness. Abdominal:      General: Bowel sounds are normal. There is no distension. Palpations: Abdomen is soft. There is no mass. Tenderness: There is no abdominal tenderness.  There is no guarding or rebound. Hernia: No hernia is present. Musculoskeletal:         General: Normal range of motion. Cervical back: Normal range of motion and neck supple. Lymphadenopathy:      Cervical: Cervical adenopathy present. Skin:     General: Skin is warm and dry. Capillary Refill: Capillary refill takes less than 2 seconds. Coloration: Skin is not pale. Findings: No erythema or rash. Neurological:      General: No focal deficit present. Mental Status: He is alert and oriented to person, place, and time. Mental status is at baseline. Psychiatric:         Thought Content: Thought content normal.         Judgment: Judgment normal.         Vital Signs  ED Triage Vitals [10/15/23 0852]   Temperature Pulse Respirations Blood Pressure SpO2   97.5 °F (36.4 °C) 94 20 151/93 98 %      Temp src Heart Rate Source Patient Position - Orthostatic VS BP Location FiO2 (%)   -- Monitor Sitting Right arm --      Pain Score       --           Vitals:    10/15/23 0852   BP: 151/93   Pulse: 94   Patient Position - Orthostatic VS: Sitting         Visual Acuity      ED Medications  Medications   ketorolac (TORADOL) injection 15 mg (has no administration in time range)       Diagnostic Studies  Results Reviewed       None                   No orders to display              Procedures  Procedures         ED Course                                             Medical Decision Making  Had a long conversation with patient regarding lab work and imaging studies performed yesterday. Small tonsillar abscesses noted in the impression as read by the radiologist.  Patient has not had worsening swelling or worsening swallowing, he did however have worsening pain this morning upon waking up with cough. He is not taking anything for cough other than cough drops.   Discussed with patient repeat labs and imaging however deferred, he agrees to wait as he just recently had labs and imaging approximately 24 hours ago and has not had any worsening signs or symptoms of peritonsillar abscess at this point in time. He is also reaching 48 hours of antibiotic therapy. Will treat cough with Tussionex, informed not to drive or hemorrhaging while taking this medication, given education regarding side effects. He is given strict return precautions for any worsening including not limited to difficulty swallowing, facial swelling, worsening swelling, fevers. Otherwise patient agrees to follow-up with his PCP or ENT. Patient and significant other very agreeable to the above assessment and plan. Discussed case with Dr. Randall Ward who is also in agreement with the plan. Risk  OTC drugs. Prescription drug management. Disposition  Final diagnoses: Tonsillitis     Time reflects when diagnosis was documented in both MDM as applicable and the Disposition within this note       Time User Action Codes Description Comment    10/15/2023  9:09 AM Yoni Naranjo Add [A93.55] Tonsillitis           ED Disposition       ED Disposition   Discharge    Condition   Stable    Date/Time   Sun Oct 15, 2023  9:09 AM    Comment   Mariel Smith discharge to home/self care.                    Follow-up Information       Follow up With Specialties Details Why Contact Info Additional [address] [facility] Emergency Medicine Go to  If symptoms worsen such as high fevers, difficulty swallowing, facial swelling, etc, otherwise please follow up with your PCP or ENT [address] [address] [facility], [address] [place] [address], [phone]    Marcia Mcneil MD Otolaryngology Schedule an appointment as soon as possible for a visit in 1 day  [address]               Patient's Medications   Discharge Prescriptions    HYDROCOD JOSE LUIS-CHLORPHE JOSE LUIS ER (TUSSIONEX) 10-8 MG/5 ML ER SUSPENSION    Take 5 mL by mouth every 12 (twelve) hours as needed for cough for up to 10 days Max Daily Amount: 10 mL       Start Date: 10/15/2023End Date: 10/25/2023       Order Dose: 5 mL       Quantity: 120 mL    Refills: 0       No discharge procedures on file.     PDMP Review       None            ED Provider  Electronically Signed by             Abram Brandon PA-C  10/15/23 3980

## 2023-10-18 LAB — BACTERIA THROAT CULT: ABNORMAL

## 2023-11-06 ENCOUNTER — CONSULT (OUTPATIENT)
Dept: SURGERY | Facility: CLINIC | Age: 28
End: 2023-11-06
Payer: COMMERCIAL

## 2023-11-06 VITALS
DIASTOLIC BLOOD PRESSURE: 92 MMHG | OXYGEN SATURATION: 97 % | HEART RATE: 101 BPM | TEMPERATURE: 98.3 F | HEIGHT: 70 IN | BODY MASS INDEX: 35.65 KG/M2 | WEIGHT: 249 LBS | SYSTOLIC BLOOD PRESSURE: 147 MMHG

## 2023-11-06 DIAGNOSIS — K42.9 UMBILICAL HERNIA WITHOUT OBSTRUCTION OR GANGRENE: ICD-10-CM

## 2023-11-06 PROCEDURE — 99243 OFF/OP CNSLTJ NEW/EST LOW 30: CPT | Performed by: SURGERY

## 2023-11-06 RX ORDER — CEFAZOLIN SODIUM 2 G/50ML
2000 SOLUTION INTRAVENOUS ONCE
OUTPATIENT
Start: 2023-11-06 | End: 2023-11-06

## 2023-11-06 NOTE — H&P
[facility] History and Physical Note:    Assessment: Umbilical Hernia without Obstruction or Gangrene     Plan:    Chief Complaint: "bubble around my belly button"     The patient is 33year old male with no PMHx, referral here for Evaluation on Umbilical Hernia without Obstruction or Gangrene. Pt states that "bubble around belly button" has been there about 6 months and it is getting bigger and started to feel pain when lifting heavy object. Pt is . Pt had no history of hernia previously. PMH:  No past medical history on file. PSH:  Past Surgical History:   Procedure Laterality Date    FRACTURE SURGERY Left     hand       Home Meds:  Current Outpatient Medications on File Prior to Visit   Medication Sig Dispense Refill    famotidine (PEPCID) 20 mg tablet Take 1 tablet (20 mg total) by mouth 2 (two) times a day for 5 days 10 tablet 0    fluticasone (FLONASE) 50 mcg/act nasal spray 1 spray into each nostril daily 16 g 0    menthol-cetylpyridinium (CEPACOL) 3 MG lozenge Take 1 lozenge (3 mg total) by mouth as needed for sore throat 18 lozenge 0    Multiple Vitamin (MULTIVITAMIN ADULT PO) Take by mouth daily after breakfast      naproxen (Naprosyn) 500 mg tablet Take 1 tablet (500 mg total) by mouth 2 (two) times a day with meals for 5 days 30 tablet 0    Omega-3 Fatty Acids (FISH OIL OMEGA-3 PO) Take by mouth daily after breakfast       No current facility-administered medications on file prior to visit. Allergies:   Allergies   Allergen Reactions    Shellfish-Derived Products - Food Allergy Anaphylaxis       Social Hx:  Social History     Socioeconomic History    Marital status: Single     Spouse name: Not on file    Number of children: Not on file    Years of education: Not on file    Highest education level: Not on file   Occupational History    Not on file   Tobacco Use    Smoking status: Never    Smokeless tobacco: Never   Vaping Use    Vaping Use: Every day    Substances: Nicotine, Flavoring   Substance and Sexual Activity    Alcohol use: Not Currently    Drug use: Never    Sexual activity: Not on file   Other Topics Concern    Not on file   Social History Narrative    Not on file     Social Determinants of Health     Financial Resource Strain: Not on file   Food Insecurity: Not on file   Transportation Needs: Not on file   Physical Activity: Not on file   Stress: Not on file   Social Connections: Not on file   Intimate Partner Violence: Not on file   Housing Stability: Not on file        Family Hx:    No family history on file. Review of Systems   Constitutional:  Negative for chills and fever. HENT:  Negative for ear pain and sore throat. Eyes:  Negative for pain and visual disturbance. Respiratory:  Negative for cough and shortness of breath. Cardiovascular:  Negative for chest pain and palpitations. Gastrointestinal:  Negative for abdominal pain, constipation, diarrhea, nausea and vomiting. Umbilical Hernia without Obstruction or Gangrene for 6 months    Genitourinary:  Negative for dysuria and hematuria. Musculoskeletal:  Negative for arthralgias, back pain, neck pain and neck stiffness. Skin:  Negative for color change and rash. Neurological:  Negative for dizziness, seizures, syncope, weakness and headaches. Psychiatric/Behavioral:  Negative for agitation, behavioral problems and confusion. The patient is not nervous/anxious. All other systems reviewed and are negative. There were no vitals taken for this visit. Physical Exam  Vitals reviewed. Constitutional:       General: He is not in acute distress. Appearance: Normal appearance. He is not ill-appearing. HENT:      Head: Normocephalic and atraumatic. Right Ear: External ear normal.      Left Ear: External ear normal.      Nose: Nose normal. No congestion or rhinorrhea.       Mouth/Throat:      Mouth: Mucous membranes are moist.   Eyes: Extraocular Movements: Extraocular movements intact. Conjunctiva/sclera: Conjunctivae normal.   Cardiovascular:      Rate and Rhythm: Normal rate and regular rhythm. Pulses: Normal pulses. Heart sounds: Normal heart sounds. No murmur heard. No gallop. Pulmonary:      Effort: Pulmonary effort is normal. No respiratory distress. Breath sounds: Normal breath sounds. No stridor. No wheezing, rhonchi or rales. Chest:      Chest wall: No tenderness. Abdominal:      General: Abdomen is flat. Bowel sounds are normal. There is no distension. Palpations: Abdomen is soft. Tenderness: There is no abdominal tenderness. There is no guarding. Hernia: A hernia is present. Musculoskeletal:         General: No swelling, tenderness or deformity. Normal range of motion. Cervical back: Normal range of motion and neck supple. Right lower leg: No edema. Left lower leg: No edema. Skin:     General: Skin is warm and dry. Capillary Refill: Capillary refill takes less than 2 seconds. Findings: No bruising, erythema, lesion or rash. Neurological:      General: No focal deficit present. Mental Status: He is alert and oriented to person, place, and time. Psychiatric:         Mood and Affect: Mood normal.         Behavior: Behavior normal.         Thought Content:  Thought content normal.       Pertinent labs reviewed    Pertinent images and available reads personally reviewed    Pertinent notes reviewed       Duglas Kovacs MD [id number] [facility]  [phone]

## 2023-11-06 NOTE — H&P (VIEW-ONLY)
[facility] History and Physical Note:    Assessment: Umbilical Hernia without Obstruction or Gangrene     Plan:    Chief Complaint: "bubble around my belly button"     The patient is 33year old male with no PMHx, referral here for Evaluation on Umbilical Hernia without Obstruction or Gangrene. Pt states that "bubble around belly button" has been there about 6 months and it is getting bigger and started to feel pain when lifting heavy object. Pt is . Pt had no history of hernia previously. PMH:  No past medical history on file. PSH:  Past Surgical History:   Procedure Laterality Date    FRACTURE SURGERY Left     hand       Home Meds:  Current Outpatient Medications on File Prior to Visit   Medication Sig Dispense Refill    famotidine (PEPCID) 20 mg tablet Take 1 tablet (20 mg total) by mouth 2 (two) times a day for 5 days 10 tablet 0    fluticasone (FLONASE) 50 mcg/act nasal spray 1 spray into each nostril daily 16 g 0    menthol-cetylpyridinium (CEPACOL) 3 MG lozenge Take 1 lozenge (3 mg total) by mouth as needed for sore throat 18 lozenge 0    Multiple Vitamin (MULTIVITAMIN ADULT PO) Take by mouth daily after breakfast      naproxen (Naprosyn) 500 mg tablet Take 1 tablet (500 mg total) by mouth 2 (two) times a day with meals for 5 days 30 tablet 0    Omega-3 Fatty Acids (FISH OIL OMEGA-3 PO) Take by mouth daily after breakfast       No current facility-administered medications on file prior to visit. Allergies:   Allergies   Allergen Reactions    Shellfish-Derived Products - Food Allergy Anaphylaxis       Social Hx:  Social History     Socioeconomic History    Marital status: Single     Spouse name: Not on file    Number of children: Not on file    Years of education: Not on file    Highest education level: Not on file   Occupational History    Not on file   Tobacco Use    Smoking status: Never    Smokeless tobacco: Never   Vaping Use    Vaping Use: Every day    Substances: Nicotine, Flavoring   Substance and Sexual Activity    Alcohol use: Not Currently    Drug use: Never    Sexual activity: Not on file   Other Topics Concern    Not on file   Social History Narrative    Not on file     Social Determinants of Health     Financial Resource Strain: Not on file   Food Insecurity: Not on file   Transportation Needs: Not on file   Physical Activity: Not on file   Stress: Not on file   Social Connections: Not on file   Intimate Partner Violence: Not on file   Housing Stability: Not on file        Family Hx:    No family history on file. Review of Systems   Constitutional:  Negative for chills and fever. HENT:  Negative for ear pain and sore throat. Eyes:  Negative for pain and visual disturbance. Respiratory:  Negative for cough and shortness of breath. Cardiovascular:  Negative for chest pain and palpitations. Gastrointestinal:  Negative for abdominal pain, constipation, diarrhea, nausea and vomiting. Umbilical Hernia without Obstruction or Gangrene for 6 months    Genitourinary:  Negative for dysuria and hematuria. Musculoskeletal:  Negative for arthralgias, back pain, neck pain and neck stiffness. Skin:  Negative for color change and rash. Neurological:  Negative for dizziness, seizures, syncope, weakness and headaches. Psychiatric/Behavioral:  Negative for agitation, behavioral problems and confusion. The patient is not nervous/anxious. All other systems reviewed and are negative. There were no vitals taken for this visit. Physical Exam  Vitals reviewed. Constitutional:       General: He is not in acute distress. Appearance: Normal appearance. He is not ill-appearing. HENT:      Head: Normocephalic and atraumatic. Right Ear: External ear normal.      Left Ear: External ear normal.      Nose: Nose normal. No congestion or rhinorrhea.       Mouth/Throat:      Mouth: Mucous membranes are moist.   Eyes: Extraocular Movements: Extraocular movements intact. Conjunctiva/sclera: Conjunctivae normal.   Cardiovascular:      Rate and Rhythm: Normal rate and regular rhythm. Pulses: Normal pulses. Heart sounds: Normal heart sounds. No murmur heard. No gallop. Pulmonary:      Effort: Pulmonary effort is normal. No respiratory distress. Breath sounds: Normal breath sounds. No stridor. No wheezing, rhonchi or rales. Chest:      Chest wall: No tenderness. Abdominal:      General: Abdomen is flat. Bowel sounds are normal. There is no distension. Palpations: Abdomen is soft. Tenderness: There is no abdominal tenderness. There is no guarding. Hernia: A hernia is present. Musculoskeletal:         General: No swelling, tenderness or deformity. Normal range of motion. Cervical back: Normal range of motion and neck supple. Right lower leg: No edema. Left lower leg: No edema. Skin:     General: Skin is warm and dry. Capillary Refill: Capillary refill takes less than 2 seconds. Findings: No bruising, erythema, lesion or rash. Neurological:      General: No focal deficit present. Mental Status: He is alert and oriented to person, place, and time. Psychiatric:         Mood and Affect: Mood normal.         Behavior: Behavior normal.         Thought Content:  Thought content normal.       Pertinent labs reviewed    Pertinent images and available reads personally reviewed    Pertinent notes reviewed       Ashlee Turner MD [address] [facility]  [phone]

## 2023-11-06 NOTE — PROGRESS NOTES
[facility] History and Physical Note:    Assessment: Umbilical Hernia without Obstruction or Gangrene     Plan: Symptomatic small umbilical hernia  Schedule outpatient elective repair of umbilical hernia     Chief Complaint: "bubble around my belly button"     The patient is 33year old male with no PMHx, referral here for Evaluation on Umbilical Hernia without Obstruction or Gangrene. Pt states that "bubble around belly button" has been there about 6 months and it is getting bigger and started to feel pain when lifting heavy object. Pt is . Pt had no history of hernia previously. PMH:  History reviewed. No pertinent past medical history. PSH:  Past Surgical History:   Procedure Laterality Date    FRACTURE SURGERY Left     hand       Home Meds:  Current Outpatient Medications on File Prior to Visit   Medication Sig Dispense Refill    Multiple Vitamin (MULTIVITAMIN ADULT PO) Take by mouth daily after breakfast      Omega-3 Fatty Acids (FISH OIL OMEGA-3 PO) Take by mouth daily after breakfast      famotidine (PEPCID) 20 mg tablet Take 1 tablet (20 mg total) by mouth 2 (two) times a day for 5 days 10 tablet 0    fluticasone (FLONASE) 50 mcg/act nasal spray 1 spray into each nostril daily (Patient not taking: Reported on 11/6/2023) 16 g 0    menthol-cetylpyridinium (CEPACOL) 3 MG lozenge Take 1 lozenge (3 mg total) by mouth as needed for sore throat (Patient not taking: Reported on 11/6/2023) 18 lozenge 0    naproxen (Naprosyn) 500 mg tablet Take 1 tablet (500 mg total) by mouth 2 (two) times a day with meals for 5 days 30 tablet 0     No current facility-administered medications on file prior to visit. Allergies:   Allergies   Allergen Reactions    Shellfish-Derived Products - Food Allergy Anaphylaxis       Social Hx:  Social History     Socioeconomic History    Marital status: Single     Spouse name: Not on file    Number of children: Not on file Years of education: Not on file    Highest education level: Not on file   Occupational History    Not on file   Tobacco Use    Smoking status: Never     Passive exposure: Never    Smokeless tobacco: Never   Vaping Use    Vaping Use: Former    Substances: Nicotine, Flavoring   Substance and Sexual Activity    Alcohol use: Not Currently    Drug use: Never    Sexual activity: Not on file   Other Topics Concern    Not on file   Social History Narrative    Not on file     Social Determinants of Health     Financial Resource Strain: Not on file   Food Insecurity: Not on file   Transportation Needs: Not on file   Physical Activity: Not on file   Stress: Not on file   Social Connections: Not on file   Intimate Partner Violence: Not on file   Housing Stability: Not on file        Family Hx:    History reviewed. No pertinent family history. Review of Systems   Constitutional:  Negative for chills and fever. HENT:  Negative for ear pain and sore throat. Eyes:  Negative for pain and visual disturbance. Respiratory:  Negative for cough and shortness of breath. Cardiovascular:  Negative for chest pain and palpitations. Gastrointestinal:  Negative for abdominal pain, constipation, diarrhea, nausea and vomiting. Umbilical Hernia without Obstruction or Gangrene for 6 months    Genitourinary:  Negative for dysuria and hematuria. Musculoskeletal:  Negative for arthralgias, back pain, neck pain and neck stiffness. Skin:  Negative for color change and rash. Neurological:  Negative for dizziness, seizures, syncope, weakness and headaches. Psychiatric/Behavioral:  Negative for agitation, behavioral problems and confusion. The patient is not nervous/anxious. All other systems reviewed and are negative.       /92 (BP Location: Left arm, Patient Position: Sitting, Cuff Size: Large)   Pulse 101   Temp 98.3 °F (36.8 °C) (Core)   Ht 5' 10" (1.778 m)   Wt 113 kg (249 lb)   SpO2 97%   BMI 35.73 kg/m² Physical Exam  Vitals reviewed. Constitutional:       General: He is not in acute distress. Appearance: Normal appearance. He is not ill-appearing. HENT:      Head: Normocephalic and atraumatic. Right Ear: External ear normal.      Left Ear: External ear normal.      Nose: Nose normal. No congestion or rhinorrhea. Mouth/Throat:      Mouth: Mucous membranes are moist.   Eyes:      Extraocular Movements: Extraocular movements intact. Conjunctiva/sclera: Conjunctivae normal.   Cardiovascular:      Rate and Rhythm: Normal rate and regular rhythm. Pulses: Normal pulses. Heart sounds: Normal heart sounds. No murmur heard. No gallop. Pulmonary:      Effort: Pulmonary effort is normal. No respiratory distress. Breath sounds: Normal breath sounds. No stridor. No wheezing, rhonchi or rales. Chest:      Chest wall: No tenderness. Abdominal:      General: Abdomen is flat. Bowel sounds are normal. There is no distension. Palpations: Abdomen is soft. Tenderness: There is no abdominal tenderness. There is no guarding. Hernia: A hernia (supra umbilical hernia, reducible and no strangulation) is present. Musculoskeletal:         General: No swelling, tenderness or deformity. Normal range of motion. Cervical back: Normal range of motion and neck supple. Right lower leg: No edema. Left lower leg: No edema. Skin:     General: Skin is warm and dry. Capillary Refill: Capillary refill takes less than 2 seconds. Findings: No bruising, erythema, lesion or rash. Neurological:      General: No focal deficit present. Mental Status: He is alert and oriented to person, place, and time. Psychiatric:         Mood and Affect: Mood normal.         Behavior: Behavior normal.         Thought Content:  Thought content normal.         Pertinent labs reviewed    Pertinent images and available reads personally reviewed    Pertinent notes reviewed Katheryn Jolley MD [address] [facility]  [phone]

## 2023-11-28 NOTE — PRE-PROCEDURE INSTRUCTIONS
Pre-Surgery Instructions:   Medication Instructions    menthol-cetylpyridinium (CEPACOL) 3 MG lozenge Hold day of surgery. Multiple Vitamin (MULTIVITAMIN ADULT PO) Stop taking 5 days prior to surgery. Omega-3 Fatty Acids (FISH OIL OMEGA-3 PO) Stop taking 5 days prior to surgery. Medication instructions for day surgery reviewed. Please use only a sip of water to take your instructed medications. Avoid all over the counter vitamins, supplements and NSAIDS for one week prior to surgery per anesthesia guidelines. Tylenol is ok to take as needed. You will receive a call one business day prior to surgery with an arrival time and hospital directions. If your surgery is scheduled on a Monday, the hospital will be calling you on the Friday prior to your surgery. If you have not heard from anyone by 8pm, please call the hospital supervisor through the hospital  at [phone]. Katherine Bernal [phone]). Do not eat or drink anything after midnight the night before your surgery, including candy, mints, lifesavers, or chewing gum. Do not drink alcohol 24hrs before your surgery. Try not to smoke at least 24hrs before your surgery. Follow the pre surgery showering instructions as listed in the [facility] Surgical Experience Booklet” or otherwise provided by your surgeon's office. Do not use a blade to shave the surgical area 1 week before surgery. It is okay to use a clean electric clippers up to 24 hours before surgery. Do not apply any lotions, creams, including makeup, cologne, deodorant, or perfumes after showering on the day of your surgery. Do not use dry shampoo, hair spray, hair gel, or any type of hair products. No contact lenses, eye make-up, or artificial eyelashes. Remove nail polish, including gel polish, and any artificial, gel, or acrylic nails if possible. Remove all jewelry including rings and body piercing jewelry. Wear causal clothing that is easy to take on and off.  Consider your type of surgery. Keep any valuables, jewelry, piercings at home. Please bring any specially ordered equipment (sling, braces) if indicated. Arrange for a responsible person to drive you to and from the hospital on the day of your surgery. Visitor Guidelines discussed. Call the surgeon's office with any new illnesses, exposures, or additional questions prior to surgery. Please reference your [facility] Surgical Experience Booklet” for additional information to prepare for your upcoming surgery.

## 2023-11-29 ENCOUNTER — ANESTHESIA EVENT (OUTPATIENT)
Dept: PERIOP | Facility: HOSPITAL | Age: 28
End: 2023-11-29
Payer: COMMERCIAL

## 2023-12-01 ENCOUNTER — HOSPITAL ENCOUNTER (OUTPATIENT)
Facility: HOSPITAL | Age: 28
Setting detail: OUTPATIENT SURGERY
Discharge: HOME | End: 2023-12-01
Attending: SURGERY | Admitting: SURGERY
Payer: COMMERCIAL

## 2023-12-01 ENCOUNTER — ANESTHESIA (OUTPATIENT)
Dept: PERIOP | Facility: HOSPITAL | Age: 28
End: 2023-12-01
Payer: COMMERCIAL

## 2023-12-01 VITALS
OXYGEN SATURATION: 95 % | WEIGHT: 243.39 LBS | SYSTOLIC BLOOD PRESSURE: 127 MMHG | DIASTOLIC BLOOD PRESSURE: 67 MMHG | RESPIRATION RATE: 18 BRPM | HEART RATE: 82 BPM | TEMPERATURE: 97.8 F | HEIGHT: 70 IN | BODY MASS INDEX: 34.84 KG/M2

## 2023-12-01 DIAGNOSIS — K42.9 UMBILICAL HERNIA WITHOUT OBSTRUCTION OR GANGRENE: Primary | ICD-10-CM

## 2023-12-01 PROBLEM — G47.30 SLEEP APNEA: Status: ACTIVE | Noted: 2023-12-01

## 2023-12-01 PROCEDURE — 49591 RPR AA HRN 1ST < 3 CM RDC: CPT | Performed by: SURGERY

## 2023-12-01 PROCEDURE — 49591 RPR AA HRN 1ST < 3 CM RDC: CPT | Performed by: PHYSICIAN ASSISTANT

## 2023-12-01 RX ORDER — ONDANSETRON 2 MG/ML
4 INJECTION INTRAMUSCULAR; INTRAVENOUS ONCE AS NEEDED
Status: DISCONTINUED | OUTPATIENT
Start: 2023-12-01 | End: 2023-12-01 | Stop reason: HOSPADM

## 2023-12-01 RX ORDER — MIDAZOLAM HYDROCHLORIDE 2 MG/2ML
INJECTION, SOLUTION INTRAMUSCULAR; INTRAVENOUS AS NEEDED
Status: DISCONTINUED | OUTPATIENT
Start: 2023-12-01 | End: 2023-12-01

## 2023-12-01 RX ORDER — BUPIVACAINE HYDROCHLORIDE AND EPINEPHRINE 5; 5 MG/ML; UG/ML
INJECTION, SOLUTION PERINEURAL AS NEEDED
Status: DISCONTINUED | OUTPATIENT
Start: 2023-12-01 | End: 2023-12-01 | Stop reason: HOSPADM

## 2023-12-01 RX ORDER — CEFAZOLIN SODIUM 2 G/50ML
2000 SOLUTION INTRAVENOUS ONCE
Status: COMPLETED | OUTPATIENT
Start: 2023-12-01 | End: 2023-12-01

## 2023-12-01 RX ORDER — MAGNESIUM HYDROXIDE 1200 MG/15ML
LIQUID ORAL AS NEEDED
Status: DISCONTINUED | OUTPATIENT
Start: 2023-12-01 | End: 2023-12-01 | Stop reason: HOSPADM

## 2023-12-01 RX ORDER — OXYCODONE HYDROCHLORIDE AND ACETAMINOPHEN 5; 325 MG/1; MG/1
1 TABLET ORAL EVERY 6 HOURS PRN
Qty: 6 TABLET | Refills: 0 | Status: SHIPPED | OUTPATIENT
Start: 2023-12-01

## 2023-12-01 RX ORDER — LIDOCAINE HYDROCHLORIDE 10 MG/ML
INJECTION, SOLUTION EPIDURAL; INFILTRATION; INTRACAUDAL; PERINEURAL AS NEEDED
Status: DISCONTINUED | OUTPATIENT
Start: 2023-12-01 | End: 2023-12-01

## 2023-12-01 RX ORDER — FENTANYL CITRATE 50 UG/ML
INJECTION, SOLUTION INTRAMUSCULAR; INTRAVENOUS AS NEEDED
Status: DISCONTINUED | OUTPATIENT
Start: 2023-12-01 | End: 2023-12-01

## 2023-12-01 RX ORDER — OXYCODONE HYDROCHLORIDE AND ACETAMINOPHEN 5; 325 MG/1; MG/1
1 TABLET ORAL ONCE
Status: COMPLETED | OUTPATIENT
Start: 2023-12-01 | End: 2023-12-01

## 2023-12-01 RX ORDER — SODIUM CHLORIDE, SODIUM LACTATE, POTASSIUM CHLORIDE, CALCIUM CHLORIDE 600; 310; 30; 20 MG/100ML; MG/100ML; MG/100ML; MG/100ML
125 INJECTION, SOLUTION INTRAVENOUS CONTINUOUS
Status: DISCONTINUED | OUTPATIENT
Start: 2023-12-01 | End: 2023-12-01 | Stop reason: HOSPADM

## 2023-12-01 RX ORDER — ONDANSETRON 2 MG/ML
INJECTION INTRAMUSCULAR; INTRAVENOUS AS NEEDED
Status: DISCONTINUED | OUTPATIENT
Start: 2023-12-01 | End: 2023-12-01

## 2023-12-01 RX ORDER — SODIUM CHLORIDE, SODIUM LACTATE, POTASSIUM CHLORIDE, CALCIUM CHLORIDE 600; 310; 30; 20 MG/100ML; MG/100ML; MG/100ML; MG/100ML
20 INJECTION, SOLUTION INTRAVENOUS CONTINUOUS
Status: DISCONTINUED | OUTPATIENT
Start: 2023-12-01 | End: 2023-12-01 | Stop reason: HOSPADM

## 2023-12-01 RX ORDER — KETOROLAC TROMETHAMINE 30 MG/ML
INJECTION, SOLUTION INTRAMUSCULAR; INTRAVENOUS AS NEEDED
Status: DISCONTINUED | OUTPATIENT
Start: 2023-12-01 | End: 2023-12-01

## 2023-12-01 RX ORDER — DEXAMETHASONE SODIUM PHOSPHATE 10 MG/ML
INJECTION, SOLUTION INTRAMUSCULAR; INTRAVENOUS AS NEEDED
Status: DISCONTINUED | OUTPATIENT
Start: 2023-12-01 | End: 2023-12-01

## 2023-12-01 RX ORDER — PROPOFOL 10 MG/ML
INJECTION, EMULSION INTRAVENOUS AS NEEDED
Status: DISCONTINUED | OUTPATIENT
Start: 2023-12-01 | End: 2023-12-01

## 2023-12-01 RX ORDER — FENTANYL CITRATE/PF 50 MCG/ML
50 SYRINGE (ML) INJECTION
Status: DISCONTINUED | OUTPATIENT
Start: 2023-12-01 | End: 2023-12-01 | Stop reason: HOSPADM

## 2023-12-01 RX ADMIN — SODIUM CHLORIDE, SODIUM LACTATE, POTASSIUM CHLORIDE, AND CALCIUM CHLORIDE: .6; .31; .03; .02 INJECTION, SOLUTION INTRAVENOUS at 10:07

## 2023-12-01 RX ADMIN — ONDANSETRON 4 MG: 2 INJECTION INTRAMUSCULAR; INTRAVENOUS at 11:33

## 2023-12-01 RX ADMIN — DEXAMETHASONE SODIUM PHOSPHATE 10 MG: 10 INJECTION, SOLUTION INTRAMUSCULAR; INTRAVENOUS at 11:33

## 2023-12-01 RX ADMIN — FENTANYL CITRATE 50 MCG: 50 INJECTION, SOLUTION INTRAMUSCULAR; INTRAVENOUS at 12:34

## 2023-12-01 RX ADMIN — PROPOFOL 200 MG: 10 INJECTION, EMULSION INTRAVENOUS at 11:24

## 2023-12-01 RX ADMIN — CEFAZOLIN SODIUM 2000 MG: 2 SOLUTION INTRAVENOUS at 11:27

## 2023-12-01 RX ADMIN — LIDOCAINE HYDROCHLORIDE 50 MG: 10 INJECTION, SOLUTION EPIDURAL; INFILTRATION; INTRACAUDAL; PERINEURAL at 11:24

## 2023-12-01 RX ADMIN — FENTANYL CITRATE 50 MCG: 50 INJECTION, SOLUTION INTRAMUSCULAR; INTRAVENOUS at 12:23

## 2023-12-01 RX ADMIN — KETOROLAC TROMETHAMINE 30 MG: 30 INJECTION, SOLUTION INTRAMUSCULAR at 11:51

## 2023-12-01 RX ADMIN — MIDAZOLAM 2 MG: 1 INJECTION INTRAMUSCULAR; INTRAVENOUS at 11:18

## 2023-12-01 RX ADMIN — OXYCODONE HYDROCHLORIDE AND ACETAMINOPHEN 1 TABLET: 5; 325 TABLET ORAL at 13:11

## 2023-12-01 RX ADMIN — FENTANYL CITRATE 100 MCG: 50 INJECTION, SOLUTION INTRAMUSCULAR; INTRAVENOUS at 11:24

## 2023-12-01 NOTE — ANESTHESIA POSTPROCEDURE EVALUATION
Post-Op Assessment Note    CV Status:  Stable  Pain Score: 0    Pain management: adequate       Mental Status:  Sleepy and arousable   Hydration Status:  Stable   PONV Controlled:  None   Airway Patency:  Patent and adequate     Post Op Vitals Reviewed: Yes    No anethesia notable event occurred.     Staff: CRNA               BP      Temp      Pulse     Resp      SpO2

## 2023-12-01 NOTE — OP NOTE
OPERATIVE REPORT  PATIENT NAME: Michelle Maloney    :  1995  MRN: [id number]  Pt Location: [facility] OR ROOM 01    SURGERY DATE: 2023    Surgeon(s) and Role:     * Rachell Pugh MD - Primary     * Leroy Arnold PA-C - Assisting    Preop Diagnosis:  Umbilical hernia without obstruction or gangrene [K42.9]    Post-Op Diagnosis Codes:     * Umbilical hernia without obstruction or gangrene [K42.9]    Procedure(s):  REPAIR HERNIA UMBILICAL    Specimen(s):  * No specimens in log *    Estimated Blood Loss:   Minimal    Drains:  * No LDAs found *    Anesthesia Type:   General    Operative Indications:  Umbilical hernia without obstruction or gangrene [K42.9]      Operative Findings:  Umbilical hernia with 1 cm fascial defect    Complications:   None    Procedure and Technique:  Umbilical hernia repair. Patient was taken back to main operating, placed supine on the operative table, general anesthesia was induced, and the abdomen was prepped and draped in normal fashion. Utilizing a curvilinear incision around the umbilicus, skin was incised. Soft tissue was divided with Bovie cautery. The dermis of the umbilicus was dissected free from a small umbilical hernia sac. The fascial margins were clearly identified. The fascia defect was 1 cm x 1 cm. This was closed with 0 Ethibond figure-of-eight sutures. The area was irrigated and the dermis of the umbilicus was tacked down to the fascial repair. Soft tissue was reapproximated with 3-0 Vicryl. 4-0 Monocryl was used to approximate the skin edges. Exofin was placed as a dressing. Large field block was created at the end of the case with local anesthesia. The patient awoke from general anesthesia, was extubated in the operating room, sent to the PACU in stable condition. CARLOS Arnold was required for technical assistance and retraction   I was present for the entire procedure., A qualified resident physician was not available. , and A physician assistant was required during the procedure for retraction, tissue handling, dissection and suturing.     Patient Disposition:  PACU         SIGNATURE: Anne Alberts MD  DATE: December 1, 2023  TIME: 11:44 AM

## 2023-12-01 NOTE — PERIOPERATIVE NURSING NOTE
Verbal report received from No Guallpa RN. Pt awake, alert, and oriented, assisted to the bathroom. Pt urinated without any difficulty. Will continue to monitor.

## 2023-12-01 NOTE — ANESTHESIA PREPROCEDURE EVALUATION
Procedure:  REPAIR HERNIA UMBILICAL (Abdomen)    Relevant Problems   ANESTHESIA (within normal limits)      CARDIO (within normal limits)      ENDO (within normal limits)      PULMONARY   (+) Sleep apnea        Physical Exam    Airway    Mallampati score: I  TM Distance: >3 FB  Neck ROM: full     Dental   No notable dental hx     Cardiovascular  Rhythm: regular, Rate: normal    Pulmonary   Breath sounds clear to auscultation    Other Findings      Anesthesia Plan  ASA Score- 2     Anesthesia Type- general with ASA Monitors. Additional Monitors:     Airway Plan:            Plan Factors-Exercise tolerance (METS): >4 METS. Chart reviewed. EKG reviewed. Existing labs reviewed. Patient summary reviewed. Patient is not a current smoker. Induction- intravenous. Postoperative Plan- Plan for postoperative opioid use. Planned trial extubation    Informed Consent- Anesthetic plan and risks discussed with patient. I personally reviewed this patient with the CRNA. Discussed and agreed on the Anesthesia Plan with the CRNA. Melinda Nelson

## 2023-12-01 NOTE — DISCHARGE INSTR - AVS FIRST PAGE
1.  Keep incision clean and dry for 2 days. After 2 days, it may get wet in the shower. No dressing is required. 2.  Take ibuprofen, 600 mg, 3 times a day regularly. 3.  Take Percocet, in addition to ibuprofen, if you need further pain control. 4.  If you do not already have an appointment, call my office at [phone] for an appointment to be seen in about 10 to 14 days.

## 2023-12-01 NOTE — PERIOPERATIVE NURSING NOTE
Patient states that he is feeling numbness of tip of his tongue since he woke up. No swelling and discoloration of tongue noted. Dr Sarina Jordan made aware, r Sarina Jordan evaluated the pt, no interventions. Ok to discharge home as per anaesthesia.

## 2023-12-04 ENCOUNTER — TELEPHONE (OUTPATIENT)
Dept: SURGERY | Facility: CLINIC | Age: 28
End: 2023-12-04

## 2023-12-04 NOTE — TELEPHONE ENCOUNTER
Asked to call patient. His main complaint is numbness at the tip of his tongue. I explained that this is of uncertain etiology, but will likely resolve on its own. Instructed him to alternate Tylenol and Motrin for his discomfort.

## 2023-12-11 ENCOUNTER — HOSPITAL ENCOUNTER (EMERGENCY)
Facility: HOSPITAL | Age: 28
Discharge: HOME | End: 2023-12-11
Attending: EMERGENCY MEDICINE
Payer: COMMERCIAL

## 2023-12-11 VITALS
HEIGHT: 70 IN | OXYGEN SATURATION: 96 % | TEMPERATURE: 98.3 F | BODY MASS INDEX: 34.79 KG/M2 | RESPIRATION RATE: 18 BRPM | DIASTOLIC BLOOD PRESSURE: 82 MMHG | SYSTOLIC BLOOD PRESSURE: 145 MMHG | HEART RATE: 119 BPM | WEIGHT: 243 LBS

## 2023-12-11 DIAGNOSIS — T81.31XA DEHISCENCE OF OPERATIVE WOUND, INITIAL ENCOUNTER: Primary | ICD-10-CM

## 2023-12-11 PROCEDURE — 99283 EMERGENCY DEPT VISIT LOW MDM: CPT

## 2023-12-11 PROCEDURE — 99283 EMERGENCY DEPT VISIT LOW MDM: CPT | Performed by: EMERGENCY MEDICINE

## 2023-12-12 NOTE — DISCHARGE INSTRUCTIONS
If you have any spreading redness, warmth, severe pain, persistent bleeding, persistent leakage of pink fluid, return to the emergency department.  We recommend keeping gauze in place until wound scabs over a second time.

## 2023-12-12 NOTE — ED PROVIDER NOTES
History  Chief Complaint   Patient presents with    Post-op Problem     States he had hernia surgery last week and the stitches came out on own and now has an open wound     HPI  Patient is a 28-year-old male presenting for evaluation of surgical wound.  Patient had umbilical hernia repair on 12/1/2023, states that he tolerated the procedure well, denied any significant postsurgical pain in the area however states that today he noted that the wound was open and slightly bleeding.  Patient denies any persistent drainage from the area, denies any purulence, surrounding redness, warmth, fevers, chills.  Prior to Admission Medications   Prescriptions Last Dose Informant Patient Reported? Taking?   Multiple Vitamin (MULTIVITAMIN ADULT PO)   Yes No   Sig: Take by mouth daily after breakfast   Omega-3 Fatty Acids (FISH OIL OMEGA-3 PO)   Yes No   Sig: Take by mouth daily after breakfast   menthol-cetylpyridinium (CEPACOL) 3 MG lozenge   No No   Sig: Take 1 lozenge (3 mg total) by mouth as needed for sore throat   oxyCODONE-acetaminophen (Percocet) 5-325 mg per tablet   No No   Sig: Take 1 tablet by mouth every 6 (six) hours as needed for moderate pain for up to 6 doses Max Daily Amount: 4 tablets      Facility-Administered Medications: None       Past Medical History:   Diagnosis Date    CPAP (continuous positive airway pressure) dependence     Sleep apnea        Past Surgical History:   Procedure Laterality Date    FRACTURE SURGERY Left     hand    VT RPR AA HERNIA 1ST < 3 CM REDUCIBLE N/A 12/1/2023    Procedure: REPAIR HERNIA UMBILICAL;  Surgeon: Severino Vasquez MD;  Location: [facility];  Service: General       History reviewed. No pertinent family history.  I have reviewed and agree with the history as documented.    E-Cigarette/Vaping    E-Cigarette Use Former User      E-Cigarette/Vaping Substances    Nicotine Yes     THC No     CBD No     Flavoring Yes     Other No     Unknown No      Social History     Tobacco Use     Smoking status: Never     Passive exposure: Never    Smokeless tobacco: Never   Vaping Use    Vaping Use: Former    Substances: Nicotine, Flavoring   Substance Use Topics    Alcohol use: Not Currently    Drug use: Never       Review of Systems   Constitutional:  Negative for chills, fatigue and fever.   Gastrointestinal:  Negative for diarrhea, nausea and vomiting.   Musculoskeletal:  Negative for arthralgias and myalgias.   Skin:  Positive for color change and wound. Negative for pallor and rash.   Neurological:  Negative for headaches.   Psychiatric/Behavioral:  Negative for confusion.    All other systems reviewed and are negative.      Physical Exam  Physical Exam  Vitals and nursing note reviewed.   Constitutional:       General: He is not in acute distress.     Appearance: Normal appearance. He is not ill-appearing, toxic-appearing or diaphoretic.      Comments: Well-appearing, nontoxic with nondistressed   HENT:      Head: Normocephalic and atraumatic.      Right Ear: External ear normal.      Left Ear: External ear normal.   Eyes:      General:         Right eye: No discharge.         Left eye: No discharge.   Pulmonary:      Effort: No respiratory distress.   Abdominal:      General: There is no distension.      Comments: Patient with small linear wound just above the umbilicus, appears relatively superficial.  Not actively bleeding.   Musculoskeletal:         General: No deformity.      Cervical back: Normal range of motion.   Skin:     Findings: No lesion or rash.   Neurological:      Mental Status: He is alert and oriented to person, place, and time. Mental status is at baseline.   Psychiatric:         Mood and Affect: Mood and affect normal.         Vital Signs  ED Triage Vitals   Temperature Pulse Respirations Blood Pressure SpO2   12/11/23 2002 12/11/23 2003 12/11/23 2002 12/11/23 2003 12/11/23 2003   98.3 °F (36.8 °C) (!) 119 18 145/82 96 %      Temp src Heart Rate Source Patient Position -  Orthostatic VS BP Location FiO2 (%)   -- -- -- -- --             Pain Score       12/11/23 2002       3           Vitals:    12/11/23 2003   BP: 145/82   Pulse: (!) 119         Visual Acuity      ED Medications  Medications - No data to display    Diagnostic Studies  Results Reviewed       None                   No orders to display              Procedures  Procedures         ED Course                               SBIRT 22yo+      Flowsheet Row Most Recent Value   Initial Alcohol Screen: US AUDIT-C     1. How often do you have a drink containing alcohol? 0 Filed at: 12/11/2023 2002   2. How many drinks containing alcohol do you have on a typical day you are drinking?  0 Filed at: 12/11/2023 2002   3a. Male UNDER 65: How often do you have five or more drinks on one occasion? 0 Filed at: 12/11/2023 2002   3b. FEMALE Any Age, or MALE 65+: How often do you have 4 or more drinks on one occassion? 0 Filed at: 12/11/2023 2002   Audit-C Score 0 Filed at: 12/11/2023 2002   DAVID: How many times in the past year have you...    Used an illegal drug or used a prescription medication for non-medical reasons? Never Filed at: 12/11/2023 2002                      Medical Decision Making  I obtained history from the patient.  I reviewed external medical documentation, specifically the surgical note on 12/1/2023 for patient's umbilical hernia repair which was apparently performed without complication and patient tolerated well.  Patient's exam consistent with small area of postsurgical dehiscence.  Remains superficial without penetration deeper into fascial layers, not bleeding or leaking.  Instructed to observe for any signs or symptoms of infection, continue to keep covered by abdominal pad, provided with reassurance, discharged with return precautions.             Disposition  Final diagnoses:   Dehiscence of operative wound, initial encounter     Time reflects when diagnosis was documented in both MDM as applicable and the  Disposition within this note       Time User Action Codes Description Comment    12/11/2023  9:25 PM Saw Harris Add [T81.31XA] Dehiscence of operative wound, initial encounter           ED Disposition       ED Disposition   Discharge    Condition   Stable    Date/Time   Mon Dec 11, 2023 2124    Comment   Junior Clements discharge to home/self care.                   Follow-up Information       Follow up With Specialties Details Why Contact Info Additional Information    [facility] Emergency [facility] Emergency Medicine  If symptoms worsen [address] [address]  [phone] [facility] Emergency Department, [address], [place], [address]            Patient's Medications   Discharge Prescriptions    No medications on file       No discharge procedures on file.    PDMP Review       None            ED Provider  Electronically Signed by             Saw Harris MD  12/11/23 2132

## 2023-12-14 ENCOUNTER — OFFICE VISIT (OUTPATIENT)
Dept: SURGERY | Facility: CLINIC | Age: 28
End: 2023-12-14

## 2023-12-14 VITALS — TEMPERATURE: 97.3 F | HEIGHT: 70 IN | BODY MASS INDEX: 36.22 KG/M2 | WEIGHT: 253 LBS

## 2023-12-14 DIAGNOSIS — Z09 SURGERY FOLLOW-UP EXAMINATION: Primary | ICD-10-CM

## 2023-12-14 PROCEDURE — 99024 POSTOP FOLLOW-UP VISIT: CPT | Performed by: SURGERY

## 2023-12-14 NOTE — PROGRESS NOTES
Patient is status post umbilical hernia repair 1 December 2023. He is here for routine follow-up. He was seen in the emergency department days ago and there was some superficial sloughing of the skin at the umbilicus but no obvious infection. Patient reports minimal pain and no wound issues. Some mild epidermal necrolysis. No infection. Patient counseled. Daily with soap and water in the shower and leave exposed to the air.   Follow-up as needed

## 2024-02-28 ENCOUNTER — APPOINTMENT (EMERGENCY)
Dept: RADIOLOGY | Facility: HOSPITAL | Age: 29
End: 2024-02-28
Payer: COMMERCIAL

## 2024-02-28 ENCOUNTER — HOSPITAL ENCOUNTER (EMERGENCY)
Facility: HOSPITAL | Age: 29
Discharge: HOME | End: 2024-02-28
Attending: EMERGENCY MEDICINE
Payer: COMMERCIAL

## 2024-02-28 VITALS
WEIGHT: 248 LBS | HEART RATE: 97 BPM | HEIGHT: 70 IN | TEMPERATURE: 97.8 F | SYSTOLIC BLOOD PRESSURE: 159 MMHG | OXYGEN SATURATION: 97 % | DIASTOLIC BLOOD PRESSURE: 98 MMHG | BODY MASS INDEX: 35.5 KG/M2 | RESPIRATION RATE: 20 BRPM

## 2024-02-28 DIAGNOSIS — M25.562 LEFT KNEE PAIN: Primary | ICD-10-CM

## 2024-02-28 PROCEDURE — 99284 EMERGENCY DEPT VISIT MOD MDM: CPT | Performed by: EMERGENCY MEDICINE

## 2024-02-28 PROCEDURE — 99283 EMERGENCY DEPT VISIT LOW MDM: CPT

## 2024-02-28 PROCEDURE — 73564 X-RAY EXAM KNEE 4 OR MORE: CPT

## 2024-02-29 NOTE — ED PROVIDER NOTES
History  Chief Complaint   Patient presents with    Knee Pain     Pt reported left knee pain when he fell and landed on knee. Denies hitting his head.      Patient presents for evaluation of left knee injury.  Patient fell getting down from a truck landing directly onto his left knee.  Denies any head injury or other injury from the fall.  Pain in the anterior knee.      History provided by:  Patient   used: No    Knee Pain      Prior to Admission Medications   Prescriptions Last Dose Informant Patient Reported? Taking?   Multiple Vitamin (MULTIVITAMIN ADULT PO) 2/28/2024  Yes Yes   Sig: Take by mouth daily after breakfast   Omega-3 Fatty Acids (FISH OIL OMEGA-3 PO) 2/28/2024  Yes Yes   Sig: Take by mouth daily after breakfast      Facility-Administered Medications: None       Past Medical History:   Diagnosis Date    CPAP (continuous positive airway pressure) dependence     Sleep apnea        Past Surgical History:   Procedure Laterality Date    FRACTURE SURGERY Left     hand    [place] RPR AA HERNIA 1ST < 3 CM REDUCIBLE N/A 12/1/2023    Procedure: REPAIR HERNIA UMBILICAL;  Surgeon: Severino Vasquez MD;  Location: [facility];  Service: General       History reviewed. No pertinent family history.  I have reviewed and agree with the history as documented.    E-Cigarette/Vaping    E-Cigarette Use Former User      E-Cigarette/Vaping Substances    Nicotine Yes     THC No     CBD No     Flavoring Yes     Other No     Unknown No      Social History     Tobacco Use    Smoking status: Never     Passive exposure: Never    Smokeless tobacco: Never   Vaping Use    Vaping status: Former    Substances: Nicotine, Flavoring   Substance Use Topics    Alcohol use: Not Currently    Drug use: Never       Review of Systems   All other systems reviewed and are negative.      Physical Exam  Physical Exam  Vitals and nursing note reviewed.   Constitutional:       General: He is not in acute distress.  Musculoskeletal:          General: Tenderness present. No deformity. Normal range of motion.        Legs:    Skin:     Capillary Refill: Capillary refill takes less than 2 seconds.      Findings: No rash.   Neurological:      General: No focal deficit present.      Mental Status: He is alert and oriented to person, place, and time.         Vital Signs  ED Triage Vitals [02/28/24 2122]   Temperature Pulse Respirations Blood Pressure SpO2   97.8 °F (36.6 °C) 97 20 159/98 97 %      Temp Source Heart Rate Source Patient Position - Orthostatic VS BP Location FiO2 (%)   Tympanic Monitor Sitting Right arm --      Pain Score       8           Vitals:    02/28/24 2122   BP: 159/98   Pulse: 97   Patient Position - Orthostatic VS: Sitting         Visual Acuity      ED Medications  Medications - No data to display    Diagnostic Studies  Results Reviewed       None                   XR knee 4+ views LEFT    (Results Pending)              Procedures  Procedures         ED Course                                             Medical Decision Making  Pulse ox 97% on room air indicating adequate oxygenation.  Xray L knee: No fracture or dislocation as read by me      Patient was able to ambulate on his own and declined crutches.  Given orthopedic follow-up to ensure resolution of his symptoms.    Amount and/or Complexity of Data Reviewed  Radiology: ordered and independent interpretation performed.             Disposition  Final diagnoses:   Left knee pain     Time reflects when diagnosis was documented in both MDM as applicable and the Disposition within this note       Time User Action Codes Description Comment    2/28/2024  9:44 PM Francisco Haywood [M25.562] Left knee pain           ED Disposition       ED Disposition   Discharge    Condition   Stable    Date/Time   Wed Feb 28, 2024  9:44 PM    Comment   Junior Clements discharge to home/self care.                   Follow-up Information       Follow up With Specialties Details Why Contact Francoise Rodriguez  Sophia Workman MD Orthopedic Surgery, Hand Surgery In 1 week As needed [address], [address]  [facility] [address] [phone]              Discharge Medication List as of 2/28/2024  9:44 PM        CONTINUE these medications which have NOT CHANGED    Details   Multiple Vitamin (MULTIVITAMIN ADULT PO) Take by mouth daily after breakfast, Historical Med      Omega-3 Fatty Acids (FISH OIL OMEGA-3 PO) Take by mouth daily after breakfast, Historical Med             No discharge procedures on file.    PDMP Review       None            ED Provider  Electronically Signed by             Francisco Haywood DO  02/28/24 1229

## 2024-06-14 ENCOUNTER — OFFICE VISIT (OUTPATIENT)
Dept: URGENT CARE | Facility: CLINIC | Age: 29
End: 2024-06-14
Payer: COMMERCIAL

## 2024-06-14 VITALS
RESPIRATION RATE: 18 BRPM | BODY MASS INDEX: 35.16 KG/M2 | HEART RATE: 84 BPM | SYSTOLIC BLOOD PRESSURE: 120 MMHG | TEMPERATURE: 96.9 F | HEIGHT: 70 IN | OXYGEN SATURATION: 96 % | DIASTOLIC BLOOD PRESSURE: 80 MMHG | WEIGHT: 245.6 LBS

## 2024-06-14 DIAGNOSIS — S61.259A CAT BITE OF FINGER, INITIAL ENCOUNTER: Primary | ICD-10-CM
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
## 2024-06-14 PROCEDURE — 99213 OFFICE O/P EST LOW 20 MIN: CPT | Performed by: FAMILY MEDICINE

## 2024-06-14 RX ORDER — AMOXICILLIN AND CLAVULANATE POTASSIUM 875; 125 MG/1; MG/1
1 TABLET, FILM COATED ORAL EVERY 12 HOURS SCHEDULED
Qty: 10 TABLET | Refills: 0 | Status: SHIPPED | OUTPATIENT
Start: 2024-06-14 | End: 2024-06-19

## 2024-06-14 NOTE — PROGRESS NOTES
[facility]        NAME: Junior Clements is a 29 y.o. male  : 1995    MRN: [id number]  DATE: 2024  TIME: 11:18 AM    Assessment and Plan   Cat bite of finger, initial encounter [S61.259A, W55.01XA]  1. Cat bite of finger, initial encounter  amoxicillin-clavulanate (AUGMENTIN) 875-125 mg per tablet        Tetanus up-to-date.  Augmentin prescribed for 1 week.  Cat reportedly vaccinated against rabies.    Patient Instructions     Follow up with PCP in 3-5 days.  Proceed to  ER if symptoms worsen.    If tests have been performed at [facility], our office will contact you with results if changes need to be made to the care plan discussed with you at the visit.  [facility]    Chief Complaint     Chief Complaint   Patient presents with    Animal Bite     Yesterday - was helping to put a cat into the carrier (shop cat) and was bit L pinky. Bite marks top and bottom. Used chlorhexidine soap and water to cleanse area. Today finger is sl swollen and tender. No draiange. States cat UTD on vaccines. Last Tdap .          History of Present Illness       29-year-old left-hand-dominant male presents today due to a left pinky bite sustained yesterday afternoon.  Reports that the cat is vaccinated and did not exhibit any abnormal behavior.  The bite occurred while the patient was trying to carry the cat into his cat carrier.  Denies any systemic symptoms, but is experiencing some stiffness of the left pinky finger.  Tdap given 2 years ago.      Review of Systems   Review of Systems   Constitutional:  Negative for chills and fever.   Respiratory:  Negative for cough and shortness of breath.    Cardiovascular:  Negative for chest pain.   Gastrointestinal:  Negative for abdominal pain and nausea.   Musculoskeletal:  Positive for joint swelling.   Skin:  Positive for wound. Negative for color change.   Neurological:  Negative for dizziness and headaches.     Current  "Medications       Current Outpatient Medications:     amoxicillin-clavulanate (AUGMENTIN) 875-125 mg per tablet, Take 1 tablet by mouth every 12 (twelve) hours for 5 days, Disp: 10 tablet, Rfl: 0    Multiple Vitamin (MULTIVITAMIN ADULT PO), Take by mouth daily after breakfast, Disp: , Rfl:     Omega-3 Fatty Acids (FISH OIL OMEGA-3 PO), Take by mouth daily after breakfast, Disp: , Rfl:     Current Allergies     Allergies as of 06/14/2024 - Reviewed 06/14/2024   Allergen Reaction Noted    Shellfish-derived products - food allergy Anaphylaxis 10/14/2023            The following portions of the patient's history were reviewed and updated as appropriate: allergies, current medications, past family history, past medical history, past social history, past surgical history and problem list.     Past Medical History:   Diagnosis Date    CPAP (continuous positive airway pressure) dependence     Sleep apnea        Past Surgical History:   Procedure Laterality Date    FRACTURE SURGERY Left     hand    AR RPR AA HERNIA 1ST < 3 CM REDUCIBLE N/A 12/1/2023    Procedure: REPAIR HERNIA UMBILICAL;  Surgeon: Severino Vasquez MD;  Location: [facility];  Service: General       No family history on file.      Medications have been verified.        Objective   /80   Pulse 84   Temp (!) 96.9 °F (36.1 °C)   Resp 18   Ht 5' 10\" (1.778 m)   Wt 111 kg (245 lb 9.6 oz)   SpO2 96%   BMI 35.24 kg/m²   No LMP for male patient.       Physical Exam     Physical Exam  Vitals and nursing note reviewed.   Constitutional:       General: He is in acute distress.      Appearance: Normal appearance. He is not ill-appearing, toxic-appearing or diaphoretic.   HENT:      Head: Normocephalic and atraumatic.   Eyes:      General:         Right eye: No discharge.         Left eye: No discharge.      Conjunctiva/sclera: Conjunctivae normal.   Pulmonary:      Effort: Pulmonary effort is normal.   Musculoskeletal:         General: Tenderness (Some " tenderness over the left pinky DIP.) and signs of injury present.   Skin:     General: Skin is warm.      Findings: Lesion (3 small scabbed over bite sites on the left pinky.) present.   Neurological:      General: No focal deficit present.      Mental Status: He is alert and oriented to person, place, and time.   Psychiatric:         Mood and Affect: Mood normal.         Behavior: Behavior normal.         Thought Content: Thought content normal.         Judgment: Judgment normal.

## 2024-11-30 ENCOUNTER — OFFICE VISIT (OUTPATIENT)
Dept: URGENT CARE | Facility: CLINIC | Age: 29
End: 2024-11-30
Payer: COMMERCIAL

## 2024-11-30 VITALS
HEIGHT: 71 IN | SYSTOLIC BLOOD PRESSURE: 138 MMHG | OXYGEN SATURATION: 98 % | WEIGHT: 255 LBS | DIASTOLIC BLOOD PRESSURE: 86 MMHG | TEMPERATURE: 99.9 F | HEART RATE: 109 BPM | RESPIRATION RATE: 18 BRPM | BODY MASS INDEX: 35.7 KG/M2

## 2024-11-30 DIAGNOSIS — R68.89 FLU-LIKE SYMPTOMS: Primary | ICD-10-CM

## 2024-11-30 PROCEDURE — 99213 OFFICE O/P EST LOW 20 MIN: CPT | Performed by: FAMILY MEDICINE

## 2024-11-30 PROCEDURE — 87636 SARSCOV2 & INF A&B AMP PRB: CPT | Performed by: FAMILY MEDICINE

## 2024-11-30 RX ORDER — OSELTAMIVIR PHOSPHATE 75 MG/1
75 CAPSULE ORAL EVERY 12 HOURS SCHEDULED
Qty: 10 CAPSULE | Refills: 0 | Status: SHIPPED | OUTPATIENT
Start: 2024-11-30 | End: 2024-12-05

## 2024-11-30 NOTE — PROGRESS NOTES
[facility]        NAME: Junior Clements is a 29 y.o. male  : 1995    MRN: [id number]  DATE: 2024  TIME: 10:35 AM    Assessment and Plan   Flu-like symptoms [R68.89]  1. Flu-like symptoms  Covid/Flu-Office Collect    oseltamivir (TAMIFLU) 75 mg capsule        COVID and flu PCR pending.  Will empirically treat with Tamiflu.  Patient advised on hydrating with plenty of fluids and gargling with warm salt water.  Also recommend taking Tylenol around-the-clock for the next 2 days to counteract generalized myalgias.    Patient Instructions     Follow up with PCP in 3-5 days.  Proceed to  ER if symptoms worsen.    If tests have been performed at [facility], our office will contact you with results if changes need to be made to the care plan discussed with you at the visit.  You can review your full results on [facility].    Chief Complaint     Chief Complaint   Patient presents with    Cold Like Symptoms     Pt states he has a sore throat , bodyaches, and fatigue that started yesterday . Pt took advil las tnight         History of Present Illness       29-year-old male presents today with flulike symptoms which started yesterday.  Reports that his girlfriend's dad was sick prior to the onset of his symptoms.      Review of Systems   Review of Systems   Constitutional:  Positive for chills, diaphoresis and fatigue. Negative for fever.   HENT:  Positive for congestion and rhinorrhea.    Respiratory:  Negative for cough, chest tightness and shortness of breath.    Cardiovascular:  Negative for chest pain.   Gastrointestinal:  Negative for abdominal pain and nausea.   Musculoskeletal:  Positive for arthralgias and myalgias.   Neurological:  Positive for dizziness and headaches (mild).     Current Medications       Current Outpatient Medications:     oseltamivir (TAMIFLU) 75 mg capsule, Take 1 capsule (75 mg total) by mouth every 12 (twelve) hours for 5 days, Disp: 10 capsule, Rfl: 0     "Multiple Vitamin (MULTIVITAMIN ADULT PO), Take by mouth daily after breakfast, Disp: , Rfl:     Omega-3 Fatty Acids (FISH OIL OMEGA-3 PO), Take by mouth daily after breakfast, Disp: , Rfl:     Current Allergies     Allergies as of 11/30/2024 - Reviewed 11/30/2024   Allergen Reaction Noted    Shellfish-derived products - food allergy Anaphylaxis 10/14/2023            The following portions of the patient's history were reviewed and updated as appropriate: allergies, current medications, past family history, past medical history, past social history, past surgical history and problem list.     Past Medical History:   Diagnosis Date    CPAP (continuous positive airway pressure) dependence     Sleep apnea        Past Surgical History:   Procedure Laterality Date    FRACTURE SURGERY Left     hand    [place] RPR AA HERNIA 1ST < 3 CM REDUCIBLE N/A 12/1/2023    Procedure: REPAIR HERNIA UMBILICAL;  Surgeon: Severino Vasquez MD;  Location: [facility];  Service: General       History reviewed. No pertinent family history.      Medications have been verified.        Objective   /86   Pulse (!) 109   Temp 99.9 °F (37.7 °C)   Resp 18   Ht 5' 11\" (1.803 m)   Wt 116 kg (255 lb)   SpO2 98%   BMI 35.57 kg/m²   No LMP for male patient.       Physical Exam     Physical Exam  Vitals and nursing note reviewed.   Constitutional:       General: He is in acute distress.      Appearance: Normal appearance. He is normal weight. He is not ill-appearing, toxic-appearing or diaphoretic.   HENT:      Head: Normocephalic and atraumatic.      Nose: Congestion and rhinorrhea present.      Comments: Inflamed nasal mucosa     Mouth/Throat:      Mouth: Mucous membranes are moist.      Pharynx: No posterior oropharyngeal erythema.   Eyes:      General:         Right eye: No discharge.         Left eye: No discharge.      Conjunctiva/sclera: Conjunctivae normal.   Cardiovascular:      Rate and Rhythm: Regular rhythm. Tachycardia present. "   Pulmonary:      Effort: Pulmonary effort is normal. No respiratory distress.      Breath sounds: Normal breath sounds. No wheezing, rhonchi or rales.   Skin:     General: Skin is warm.      Findings: No erythema.   Neurological:      General: No focal deficit present.      Mental Status: He is alert and oriented to person, place, and time.   Psychiatric:         Mood and Affect: Mood normal.         Behavior: Behavior normal.         Thought Content: Thought content normal.         Judgment: Judgment normal.

## 2024-12-02 LAB
FLUAV RNA RESP QL NAA+PROBE: NEGATIVE
FLUBV RNA RESP QL NAA+PROBE: NEGATIVE
SARS-COV-2 RNA RESP QL NAA+PROBE: NEGATIVE

## 2024-12-03 ENCOUNTER — OFFICE VISIT (OUTPATIENT)
Dept: URGENT CARE | Facility: CLINIC | Age: 29
End: 2024-12-03
Payer: COMMERCIAL

## 2024-12-03 VITALS
HEIGHT: 71 IN | RESPIRATION RATE: 18 BRPM | DIASTOLIC BLOOD PRESSURE: 88 MMHG | HEART RATE: 100 BPM | SYSTOLIC BLOOD PRESSURE: 130 MMHG | OXYGEN SATURATION: 97 % | TEMPERATURE: 98.2 F | BODY MASS INDEX: 35.57 KG/M2

## 2024-12-03 DIAGNOSIS — R05.1 ACUTE COUGH: ICD-10-CM

## 2024-12-03 DIAGNOSIS — J06.9 VIRAL UPPER RESPIRATORY TRACT INFECTION: Primary | ICD-10-CM

## 2024-12-03 PROCEDURE — 99213 OFFICE O/P EST LOW 20 MIN: CPT | Performed by: PHYSICIAN ASSISTANT

## 2024-12-03 RX ORDER — BENZONATATE 200 MG/1
200 CAPSULE ORAL 3 TIMES DAILY PRN
Qty: 20 CAPSULE | Refills: 0 | Status: SHIPPED | OUTPATIENT
Start: 2024-12-03

## 2024-12-03 NOTE — PROGRESS NOTES
[facility]        NAME: Junior Clements is a 29 y.o. male  : 1995    MRN: [id number]  DATE: December 3, 2024  TIME: 3:23 PM    Assessment and Plan   Viral upper respiratory tract infection [J06.9]  1. Viral upper respiratory tract infection  benzonatate (TESSALON) 200 MG capsule      2. Acute cough  benzonatate (TESSALON) 200 MG capsule          Patient Instructions   Viral upper respiratory infection  Rx tessalon pearls sent via EMR  Recommend flonase nasal spray and sudafed for postnasal drip/cough  Warm salt water gargles  Rest, fluids and supportive care  May benefit from a cool mist humidifier on night stand  Tylenol/ibuprofen as needed for pain/fever    Follow up with PCP in 3-5 days.  Proceed to  ER if symptoms worsen.    If tests have been performed at [facility], our office will contact you with results if changes need to be made to the care plan discussed with you at the visit.  You can review your full results on [facility].    Chief Complaint     Chief Complaint   Patient presents with    Cold Like Symptoms     The patient was seen on Friday at this urgent care. For the same C/O sore throat, nasal congestion, cough. Fatigue weakness and is not feeling any better.         History of Present Illness       Junior is a 29-year-old male who presents to clinic complaining of continued cold symptoms.  He states his symptoms started Thursday with fatigue nasal congestion and cough.  He was seen in the next day and tested negative for flu and COVID he was started on Tamiflu but that when the results came and he stopped it.  He states he still has nasal congestion, cough, chills and sore throat.  He describes the cough as dry and productive and worse when he lays down at night.  He denies any fever, ear pain, shortness of breath, nausea, vomiting, diarrhea, loss of taste or smell, recent travel, or any known sick contacts.        Review of Systems   Review of Systems   Constitutional:  Positive  "for chills and fatigue. Negative for fever.   HENT:  Positive for congestion and sore throat. Negative for ear pain, rhinorrhea, sinus pressure and sinus pain.    Respiratory:  Positive for cough. Negative for shortness of breath.    Gastrointestinal:  Negative for diarrhea, nausea and vomiting.   Musculoskeletal:  Negative for myalgias.   Neurological:  Negative for headaches.         Current Medications       Current Outpatient Medications:     benzonatate (TESSALON) 200 MG capsule, Take 1 capsule (200 mg total) by mouth 3 (three) times a day as needed for cough, Disp: 20 capsule, Rfl: 0    Multiple Vitamin (MULTIVITAMIN ADULT PO), Take by mouth daily after breakfast, Disp: , Rfl:     Omega-3 Fatty Acids (FISH OIL OMEGA-3 PO), Take by mouth daily after breakfast, Disp: , Rfl:     oseltamivir (TAMIFLU) 75 mg capsule, Take 1 capsule (75 mg total) by mouth every 12 (twelve) hours for 5 days (Patient not taking: Reported on 12/3/2024), Disp: 10 capsule, Rfl: 0    Current Allergies     Allergies as of 12/03/2024 - Reviewed 12/03/2024   Allergen Reaction Noted    Shellfish-derived products - food allergy Anaphylaxis 10/14/2023            The following portions of the patient's history were reviewed and updated as appropriate: allergies, current medications, past family history, past medical history, past social history, past surgical history and problem list.     Past Medical History:   Diagnosis Date    CPAP (continuous positive airway pressure) dependence     Sleep apnea        Past Surgical History:   Procedure Laterality Date    FRACTURE SURGERY Left     hand    [place] RPR AA HERNIA 1ST < 3 CM REDUCIBLE N/A 12/1/2023    Procedure: REPAIR HERNIA UMBILICAL;  Surgeon: Severino Vasquez MD;  Location: [facility];  Service: General       History reviewed. No pertinent family history.      Medications have been verified.        Objective   /88   Pulse 100   Temp 98.2 °F (36.8 °C)   Resp 18   Ht 5' 11\" (1.803 m)   " SpO2 97%   BMI 35.57 kg/m²   No LMP for male patient.       Physical Exam     Physical Exam  Vitals and nursing note reviewed.   Constitutional:       General: He is not in acute distress.     Appearance: Normal appearance. He is not ill-appearing.   HENT:      Right Ear: Tympanic membrane, ear canal and external ear normal.      Left Ear: Tympanic membrane, ear canal and external ear normal.      Nose: Congestion present.      Mouth/Throat:      Mouth: Mucous membranes are moist.      Pharynx: No oropharyngeal exudate or posterior oropharyngeal erythema.   Cardiovascular:      Rate and Rhythm: Normal rate and regular rhythm.      Heart sounds: Normal heart sounds.   Pulmonary:      Effort: Pulmonary effort is normal. No respiratory distress.      Breath sounds: Normal breath sounds. No stridor. No wheezing, rhonchi or rales.   Lymphadenopathy:      Cervical: No cervical adenopathy.   Neurological:      Mental Status: He is alert and oriented to person, place, and time.   Psychiatric:         Mood and Affect: Mood normal.         Behavior: Behavior normal.

## 2024-12-31 ENCOUNTER — OFFICE VISIT (OUTPATIENT)
Dept: URGENT CARE | Facility: CLINIC | Age: 29
End: 2024-12-31
Payer: COMMERCIAL

## 2024-12-31 VITALS
DIASTOLIC BLOOD PRESSURE: 100 MMHG | BODY MASS INDEX: 36.54 KG/M2 | HEIGHT: 71 IN | OXYGEN SATURATION: 97 % | SYSTOLIC BLOOD PRESSURE: 152 MMHG | WEIGHT: 261 LBS | HEART RATE: 101 BPM | TEMPERATURE: 97.6 F | RESPIRATION RATE: 20 BRPM

## 2024-12-31 DIAGNOSIS — J02.9 SORE THROAT: ICD-10-CM

## 2024-12-31 DIAGNOSIS — J02.0 ACUTE STREPTOCOCCAL PHARYNGITIS: Primary | ICD-10-CM

## 2024-12-31 LAB — S PYO AG THROAT QL: POSITIVE

## 2024-12-31 PROCEDURE — 87880 STREP A ASSAY W/OPTIC: CPT | Performed by: PHYSICIAN ASSISTANT

## 2024-12-31 PROCEDURE — 99213 OFFICE O/P EST LOW 20 MIN: CPT | Performed by: PHYSICIAN ASSISTANT

## 2024-12-31 RX ORDER — AMOXICILLIN 500 MG/1
500 CAPSULE ORAL EVERY 12 HOURS SCHEDULED
Qty: 20 CAPSULE | Refills: 0 | Status: SHIPPED | OUTPATIENT
Start: 2024-12-31 | End: 2025-01-10

## 2024-12-31 NOTE — PROGRESS NOTES
[facility]        NAME: Junior Clements is a 29 y.o. male  : 1995    MRN: [id number]  DATE: 2024  TIME: 2:25 PM    Assessment and Plan   Acute streptococcal pharyngitis [J02.0]  1. Acute streptococcal pharyngitis  amoxicillin (AMOXIL) 500 mg capsule      2. Sore throat  POCT rapid ANTIGEN strepA            Patient Instructions   Acute strep pharyngitis  Rapid strep postitive  Rx amoxicillin twice daily x 10 days sent via EMR  Chest pain musculoskeletal related to increased exercise  Recommend warm salt water gargles  tylenol/ibuprofen as needed for pain/fever  Rest, fluids and supportive care     Follow up with PCP in 3-5 days.  Proceed to  ER if symptoms worsen.    If tests have been performed at [facility], our office will contact you with results if changes need to be made to the care plan discussed with you at the visit.  You can [facility] full [facility] on [facility].    Chief Complaint     Chief Complaint   Patient presents with    Sore Throat     C/O of LT under the rib, chills, sore throat and SOB for the past two days. Took Advil  pm.         History of Present Illness       Junior is a 29-year-old male who presents to clinic complaining of sore throat x 2 days.  He states he also has not eaten much in 2 days due to the decreased appetite.  He states breathing in and out hurts his throat.  He also notes chills and chest pain.  He states that he just recently started going as to the gym and did a pretty heavy chest and arm workout recently.  He denies any shortness of breath or difficulty breathing only noted the pain with breathing.  He is able to swallow but increased pain due to swallowing.  He denies any fever, nasal congestion, rhinorrhea, ear pain, nausea, vomiting, diarrhea, loss of taste or smell, recent travel, or any known sick contacts.        Review of Systems   Review of Systems   Constitutional:  Positive for chills. Negative for fatigue and fever.   HENT:   Positive for sore throat. Negative for congestion, ear pain and rhinorrhea.    Respiratory:  Negative for cough, chest tightness and shortness of breath.    Cardiovascular:  Positive for chest pain.   Gastrointestinal:  Negative for diarrhea, nausea and vomiting.   Musculoskeletal:  Negative for myalgias.   Neurological:  Negative for headaches.         Current Medications       Current Outpatient Medications:     amoxicillin (AMOXIL) 500 mg capsule, Take 1 capsule (500 mg total) by mouth every 12 (twelve) hours for 10 days, Disp: 20 capsule, Rfl: 0    benzonatate (TESSALON) 200 MG capsule, Take 1 capsule (200 mg total) by mouth 3 (three) times a day as needed for cough (Patient not taking: Reported on 12/31/2024), Disp: 20 capsule, Rfl: 0    Multiple Vitamin (MULTIVITAMIN ADULT PO), Take by mouth daily after breakfast (Patient not taking: Reported on 12/31/2024), Disp: , Rfl:     Omega-3 Fatty Acids (FISH OIL OMEGA-3 PO), Take by mouth daily after breakfast (Patient not taking: Reported on 12/31/2024), Disp: , Rfl:     Current Allergies     Allergies as of 12/31/2024 - Reviewed 12/31/2024   Allergen Reaction Noted    Shellfish-derived products - food allergy Anaphylaxis 10/14/2023            The following portions of the patient's history were reviewed and updated as appropriate: allergies, current medications, past family history, past medical history, past social history, past surgical history and problem list.     Past Medical History:   Diagnosis Date    CPAP (continuous positive airway pressure) dependence     Sleep apnea        Past Surgical History:   Procedure Laterality Date    FRACTURE SURGERY Left     hand    [place] RPR AA HERNIA 1ST < 3 CM REDUCIBLE N/A 12/1/2023    Procedure: REPAIR HERNIA UMBILICAL;  Surgeon: Severino Vasquez MD;  Location: [facility];  Service: General       History reviewed. No pertinent family history.      Medications have been verified.        Objective   /100   Pulse 101    "Temp 97.6 °F (36.4 °C)   Resp 20   Ht 5' 11\" (1.803 m)   Wt 118 kg (261 lb)   SpO2 97%   BMI 36.40 kg/m²   No LMP for male patient.       Physical Exam     Physical Exam  Vitals and nursing note reviewed.   Constitutional:       General: He is not in acute distress.     Appearance: He is well-developed. He is not ill-appearing.   HENT:      Right Ear: Tympanic membrane and ear canal normal.      Left Ear: Tympanic membrane and ear canal normal.      Nose: No congestion or rhinorrhea.      Mouth/Throat:      Mouth: Mucous membranes are moist.      Pharynx: Uvula midline. Posterior oropharyngeal erythema present. No pharyngeal swelling, oropharyngeal exudate or uvula swelling.      Tonsils: No tonsillar exudate. 1+ on the right. 1+ on the left.   Cardiovascular:      Rate and Rhythm: Normal rate and regular rhythm.      Heart sounds: Normal heart sounds.   Pulmonary:      Effort: Pulmonary effort is normal.      Breath sounds: Normal breath sounds.   Lymphadenopathy:      Cervical: Cervical adenopathy present.   Neurological:      Mental Status: He is alert and oriented to person, place, and time.   Psychiatric:         Mood and Affect: Mood normal.         Behavior: Behavior normal.                   "

## 2025-01-07 ENCOUNTER — OFFICE VISIT (OUTPATIENT)
Dept: FAMILY MEDICINE CLINIC | Facility: CLINIC | Age: 30
End: 2025-01-07
Payer: COMMERCIAL

## 2025-01-07 ENCOUNTER — APPOINTMENT (OUTPATIENT)
Dept: RADIOLOGY | Facility: CLINIC | Age: 30
End: 2025-01-07
Payer: COMMERCIAL

## 2025-01-07 VITALS
WEIGHT: 260.6 LBS | HEIGHT: 69 IN | BODY MASS INDEX: 38.6 KG/M2 | DIASTOLIC BLOOD PRESSURE: 80 MMHG | SYSTOLIC BLOOD PRESSURE: 134 MMHG | HEART RATE: 95 BPM | RESPIRATION RATE: 18 BRPM | TEMPERATURE: 97.6 F

## 2025-01-07 DIAGNOSIS — Z11.4 SCREENING FOR HIV (HUMAN IMMUNODEFICIENCY VIRUS): ICD-10-CM

## 2025-01-07 DIAGNOSIS — Z13.220 SCREENING FOR LIPID DISORDERS: ICD-10-CM

## 2025-01-07 DIAGNOSIS — M25.561 ACUTE PAIN OF RIGHT KNEE: ICD-10-CM

## 2025-01-07 DIAGNOSIS — Z76.89 ENCOUNTER TO ESTABLISH CARE: ICD-10-CM

## 2025-01-07 DIAGNOSIS — Z13.1 SCREENING FOR DIABETES MELLITUS (DM): ICD-10-CM

## 2025-01-07 DIAGNOSIS — J02.0 STREP PHARYNGITIS: ICD-10-CM

## 2025-01-07 DIAGNOSIS — M25.561 ACUTE PAIN OF RIGHT KNEE: Primary | ICD-10-CM

## 2025-01-07 DIAGNOSIS — Z11.59 NEED FOR HEPATITIS C SCREENING TEST: ICD-10-CM

## 2025-01-07 DIAGNOSIS — R53.83 OTHER FATIGUE: ICD-10-CM

## 2025-01-07 DIAGNOSIS — G47.33 OBSTRUCTIVE SLEEP APNEA SYNDROME: ICD-10-CM

## 2025-01-07 PROBLEM — K42.9 UMBILICAL HERNIA WITHOUT OBSTRUCTION OR GANGRENE: Status: RESOLVED | Noted: 2023-12-01 | Resolved: 2025-01-07

## 2025-01-07 PROCEDURE — 73562 X-RAY EXAM OF KNEE 3: CPT

## 2025-01-07 PROCEDURE — 36415 COLL VENOUS BLD VENIPUNCTURE: CPT | Performed by: NURSE PRACTITIONER

## 2025-01-07 PROCEDURE — 99214 OFFICE O/P EST MOD 30 MIN: CPT | Performed by: NURSE PRACTITIONER

## 2025-01-07 NOTE — PROGRESS NOTES
Name: Junior Clements      : 1995      MRN: [id number]  Encounter Provider: MITZI Tinajero  Encounter Date: 2025   Encounter department: [facility]  :  Assessment & Plan  Acute pain of right knee  Will check xray  Will most likely to refer to ortho but will check xray first  Orders:    XR knee 3 vw right non injury; Future    Strep pharyngitis  Resolving. Finish abx as prescribed.        Obstructive sleep apnea syndrome  cpap  Orders:    Comprehensive metabolic panel    CBC and differential    Encounter to establish care  Heart healthy, carbohydrate controlled diet- limit red meat, limit saturated fat, moderate salt intake, limit junk food, etc.   Regular exercise  Stress management  Routine labwork and screenings as ordered.   Orders:    Comprehensive metabolic panel    CBC and differential    Hemoglobin A1C    Lipid panel    Hepatitis C antibody    HIV 1/2 AG/AB W REFLEX [facility] only    Screening for diabetes mellitus (DM)  Orders:    Comprehensive metabolic panel    Hemoglobin A1C    Screening for lipid disorders  Orders:    Lipid panel    Screening for HIV (human immunodeficiency virus)  Orders:    HIV 1/2 AG/AB W REFLEX [facility] only    Need for hepatitis C screening test  Orders:    Hepatitis C antibody    Other fatigue  Orders:    TSH, 3rd generation    Lyme Total AB W Reflex to IGM/IGG           History of Present Illness     Pt here to establish care.   PMH, PSH, meds, allergies, SH, FH reviewed.   History: reviewed  FH: mother- healthy. Father- healthy. Siblings healthy  SH: single, lives with girlfriend. Owns heavy equipment shop. Vapes occ. Rare etoh. No recreational drugs.   Current medications: reviewed  Current issues include:   Recent strep, on amoxicillin, diagnosed   Right knee pain for about year.   Started after jumping off dump truck. Went to ED 2024. Xrays at time negative. Feels like something is floating in knee.  "Certain activities such as squats cause pain.   Never saw orthopedics.   Uses cpap for sleep apnea.  Does feel tired all the time, several months.   No recent illness.           Knee Pain   Pertinent negatives include no numbness.     Review of Systems   Constitutional:  Positive for fatigue.   HENT:  Negative for congestion, sinus pressure and sinus pain.    Eyes:  Negative for visual disturbance.   Respiratory:  Negative for cough, chest tightness and shortness of breath.    Cardiovascular:  Negative for chest pain and palpitations.   Gastrointestinal:  Negative for abdominal pain, constipation, diarrhea, nausea and vomiting.   Endocrine: Negative for polydipsia and polyuria.   Genitourinary:  Negative for dysuria.   Musculoskeletal:  Positive for arthralgias (right knee).   Skin:  Negative for rash and wound.   Allergic/Immunologic: Negative for immunocompromised state.   Neurological:  Negative for dizziness, numbness and headaches.   Psychiatric/Behavioral:  Negative for dysphoric mood. The patient is not nervous/anxious.        Objective   /80   Pulse 95   Temp 97.6 °F (36.4 °C)   Resp 18   Ht 5' 9\" (1.753 m)   Wt 118 kg (260 lb 9.6 oz)   BMI 38.48 kg/m²      Physical Exam  Vitals reviewed.   Constitutional:       General: He is not in acute distress.     Appearance: He is not ill-appearing.   Cardiovascular:      Rate and Rhythm: Normal rate and regular rhythm.   Pulmonary:      Effort: Pulmonary effort is normal. No respiratory distress.      Breath sounds: Normal breath sounds. No wheezing or rales.   Musculoskeletal:         General: No swelling, tenderness or deformity. Normal range of motion.      Cervical back: Normal range of motion.      Comments: Right patella stable.   No visible or palpable effusion right knee   Skin:     General: Skin is warm and dry.      Coloration: Skin is not jaundiced or pale.   Neurological:      General: No focal deficit present.      Mental Status: He is alert " and oriented to person, place, and time.      Cranial Nerves: No cranial nerve deficit.      Sensory: No sensory deficit.   Psychiatric:         Mood and Affect: Mood normal.         Behavior: Behavior normal.         Thought Content: Thought content normal.         Judgment: Judgment normal.

## 2025-01-08 LAB
ALBUMIN SERPL-MCNC: 4.9 G/DL (ref 4.3–5.2)
ALP SERPL-CCNC: 99 IU/L (ref 44–121)
ALT SERPL-CCNC: 172 IU/L (ref 0–44)
AST SERPL-CCNC: 79 IU/L (ref 0–40)
B BURGDOR IGG+IGM SER QL IA: NEGATIVE
BASOPHILS # BLD AUTO: 0 X10E3/UL (ref 0–0.2)
BASOPHILS NFR BLD AUTO: 1 %
BILIRUB SERPL-MCNC: 0.8 MG/DL (ref 0–1.2)
BUN SERPL-MCNC: 19 MG/DL (ref 6–20)
BUN/CREAT SERPL: 18 (ref 9–20)
CALCIUM SERPL-MCNC: 10.2 MG/DL (ref 8.7–10.2)
CHLORIDE SERPL-SCNC: 97 MMOL/L (ref 96–106)
CHOLEST SERPL-MCNC: 219 MG/DL (ref 100–199)
CHOLEST/HDLC SERPL: 4.9 RATIO (ref 0–5)
CO2 SERPL-SCNC: 21 MMOL/L (ref 20–29)
CREAT SERPL-MCNC: 1.05 MG/DL (ref 0.76–1.27)
EGFR: 99 ML/MIN/1.73
EOSINOPHIL # BLD AUTO: 0.2 X10E3/UL (ref 0–0.4)
EOSINOPHIL NFR BLD AUTO: 3 %
ERYTHROCYTE [DISTWIDTH] IN BLOOD BY AUTOMATED COUNT: 12.9 % (ref 11.6–15.4)
EST. AVERAGE GLUCOSE BLD GHB EST-MCNC: 117 MG/DL
GLOBULIN SER-MCNC: 3.1 G/DL (ref 1.5–4.5)
GLUCOSE SERPL-MCNC: 92 MG/DL (ref 70–99)
HBA1C MFR BLD: 5.7 % (ref 4.8–5.6)
HCT VFR BLD AUTO: 50.9 % (ref 37.5–51)
HCV AB S/CO SERPL IA: NON REACTIVE
HDLC SERPL-MCNC: 45 MG/DL
HGB BLD-MCNC: 17.1 G/DL (ref 13–17.7)
HIV 1+2 AB+HIV1 P24 AG SERPL QL IA: NON REACTIVE
IMM GRANULOCYTES # BLD: 0.1 X10E3/UL (ref 0–0.1)
IMM GRANULOCYTES NFR BLD: 1 %
LDLC SERPL CALC-MCNC: 148 MG/DL (ref 0–99)
LYMPHOCYTES # BLD AUTO: 2.4 X10E3/UL (ref 0.7–3.1)
LYMPHOCYTES NFR BLD AUTO: 30 %
MCH RBC QN AUTO: 30.1 PG (ref 26.6–33)
MCHC RBC AUTO-ENTMCNC: 33.6 G/DL (ref 31.5–35.7)
MCV RBC AUTO: 90 FL (ref 79–97)
MONOCYTES # BLD AUTO: 0.6 X10E3/UL (ref 0.1–0.9)
MONOCYTES NFR BLD AUTO: 8 %
NEUTROPHILS # BLD AUTO: 4.6 X10E3/UL (ref 1.4–7)
NEUTROPHILS NFR BLD AUTO: 57 %
PLATELET # BLD AUTO: 285 X10E3/UL (ref 150–450)
POTASSIUM SERPL-SCNC: 5.1 MMOL/L (ref 3.5–5.2)
PROT SERPL-MCNC: 8 G/DL (ref 6–8.5)
RBC # BLD AUTO: 5.69 X10E6/UL (ref 4.14–5.8)
SL AMB VLDL CHOLESTEROL CALC: 26 MG/DL (ref 5–40)
SODIUM SERPL-SCNC: 138 MMOL/L (ref 134–144)
TRIGL SERPL-MCNC: 144 MG/DL (ref 0–149)
TSH SERPL DL<=0.005 MIU/L-ACNC: 2.55 UIU/ML (ref 0.45–4.5)
WBC # BLD AUTO: 8 X10E3/UL (ref 3.4–10.8)

## 2025-01-17 ENCOUNTER — OFFICE VISIT (OUTPATIENT)
Dept: FAMILY MEDICINE CLINIC | Facility: CLINIC | Age: 30
End: 2025-01-17
Payer: COMMERCIAL

## 2025-01-17 VITALS
WEIGHT: 255.8 LBS | RESPIRATION RATE: 18 BRPM | TEMPERATURE: 97.6 F | HEART RATE: 100 BPM | OXYGEN SATURATION: 95 % | SYSTOLIC BLOOD PRESSURE: 134 MMHG | BODY MASS INDEX: 35.81 KG/M2 | HEIGHT: 71 IN | DIASTOLIC BLOOD PRESSURE: 82 MMHG

## 2025-01-17 DIAGNOSIS — Z00.00 ANNUAL PHYSICAL EXAM: ICD-10-CM

## 2025-01-17 DIAGNOSIS — M25.561 CHRONIC PAIN OF RIGHT KNEE: Primary | ICD-10-CM

## 2025-01-17 DIAGNOSIS — G89.29 CHRONIC PAIN OF RIGHT KNEE: Primary | ICD-10-CM

## 2025-01-17 DIAGNOSIS — R79.89 ELEVATED LFTS: ICD-10-CM

## 2025-01-17 PROBLEM — R73.03 PREDIABETES: Status: ACTIVE | Noted: 2025-01-17

## 2025-01-17 PROCEDURE — 99395 PREV VISIT EST AGE 18-39: CPT | Performed by: NURSE PRACTITIONER

## 2025-01-17 PROCEDURE — 99213 OFFICE O/P EST LOW 20 MIN: CPT | Performed by: NURSE PRACTITIONER

## 2025-01-17 NOTE — PATIENT INSTRUCTIONS
Schedule appt with orthopedics to evaluation right knee issue  Schedule abdominal ultrasound to evaluate liver due to abnormal labs as discussed.   Heart healthy, carbohydrate controlled diet- limit red meat, limit saturated fat, moderate salt intake, limit junk food, etc.   Regular exercise  Stress management  Routine labwork and screenings as ordered.

## 2025-01-17 NOTE — PROGRESS NOTES
"[facility] HEALTH MAINTENANCE OFFICE VISIT  [facility] - [facility] PRACT[facility]    NAME: Junior Clements  AGE: 29 y.o. SEX: male  : 1995     DATE: 2025    Assessment and Plan   1. Annual physical exam  Heart healthy, carbohydrate controlled diet- limit red meat, limit saturated fat, moderate salt intake, limit junk food, etc.   Regular exercise  Stress management  Routine labwork and screenings as ordered.       2. Chronic pain of right knee (Primary)  - Ambulatory Referral to Orthopedic Surgery; Future    3. Elevated LFTs  Pt denies etoh, no abd or GI symptoms.   Will check ultrasound  - US abdomen complete; Future        Patient Counseling:   Nutrition: Stressed importance of a well balanced diet, moderation of sodium/saturated fat, caloric balance and sufficient intake of fiber  Exercise: Stressed the importance of regular exercise with a goal of 150 minutes per week  Dental Health: Discussed daily flossing and brushing and regular dental visits     Immunizations reviewed: Risks and Benefits discussed  Discussed benefits of:  Screening labs.  BMI Counseling: Body mass index is 35.68 kg/m². Discussed with patient's BMI with him. The BMI is above normal. Nutrition recommendations include reducing portion sizes, decreasing overall calorie intake, moderation in carbohydrate intake, reducing intake of saturated fat and trans fat, and reducing intake of cholesterol. Exercise recommendations include exercising 3-5 times per week.            Chief Complaint     Chief Complaint   Patient presents with    Physical Exam       History of Present Illness     Here for annual exam , lab review, and follow up on right knee issue  Injury to right knee 2024 when jumped off back of [facility]. Seen in ED at that time. Never saw ortho. Having ongoing issues with knee. Painful at times. Feels \"like something is floating around in the knee\". Xray done last week and was normal. " Agreeable to see ortho at this time.   Feels well.             Well Adult Physical   Patient here for a comprehensive physical exam.      Diet and Physical Activity  Diet: poor diet  Exercise: several times per week      Depression Screen  PHQ-2/9 Depression Screening    Little interest or pleasure in doing things: 0 - not at all  Feeling down, depressed, or hopeless: 0 - not at all  PHQ-2 Score: 0  PHQ-2 Interpretation: Negative depression screen          General Health  Hearing: Normal:  bilateral  Vision: most recent eye exam >1 year and wears glasses  Dental: no dental visits for >1 year          The following portions of the patient's history were reviewed and updated as appropriate: allergies, current medications, past family history, past medical history, past social history, past surgical history and problem list.    Review of Systems     Review of Systems   Constitutional:  Negative for chills, diaphoresis, fatigue and fever.   HENT:  Negative for congestion, sinus pressure, sinus pain and sore throat.    Eyes:  Negative for visual disturbance.   Respiratory:  Negative for cough, chest tightness, shortness of breath and wheezing.    Cardiovascular:  Negative for chest pain, palpitations and leg swelling.   Gastrointestinal:  Negative for abdominal distention, abdominal pain, constipation, diarrhea, nausea and vomiting.   Endocrine: Negative for polydipsia and polyuria.   Genitourinary:  Negative for dysuria, frequency and urgency.   Musculoskeletal:  Positive for arthralgias (right knee). Negative for back pain and neck pain.   Skin:  Negative for rash.   Allergic/Immunologic: Negative for immunocompromised state.   Neurological:  Negative for dizziness, weakness and headaches.   Hematological:  Negative for adenopathy.   Psychiatric/Behavioral:  Negative for dysphoric mood. The patient is not nervous/anxious.        Past Medical History     Past Medical History:   Diagnosis Date    CPAP (continuous  positive airway pressure) dependence     Sleep apnea     Umbilical hernia without obstruction or gangrene 12/01/2023       Past Surgical History     Past Surgical History:   Procedure Laterality Date    FRACTURE SURGERY Left     hand    NH RPR AA HERNIA 1ST < 3 CM REDUCIBLE N/A 12/1/2023    Procedure: REPAIR HERNIA UMBILICAL;  Surgeon: Severino Vasquez MD;  Location: [facility];  Service: General       Social History     Social History     Socioeconomic History    Marital status: Single     Spouse name: None    Number of children: None    Years of education: None    Highest education level: None   Occupational History    None   Tobacco Use    Smoking status: Never     Passive exposure: Never    Smokeless tobacco: Never   Vaping Use    Vaping status: Some Days    Substances: Nicotine, Flavoring   Substance and Sexual Activity    Alcohol use: Never    Drug use: Never    Sexual activity: None   Other Topics Concern    None   Social History Narrative    None     Social Drivers of Health     Financial Resource Strain: Not on file   Food Insecurity: Not on file   Transportation Needs: Not on file   Physical Activity: Not on file   Stress: Not on file   Social Connections: Not on file   Intimate Partner Violence: Not on file   Housing Stability: Not on file       Family History     History reviewed. No pertinent family history.    Current Medications       Current Outpatient Medications:     Multiple Vitamin (MULTIVITAMIN ADULT PO), Take by mouth daily after breakfast, Disp: , Rfl:     Omega-3 Fatty Acids (FISH OIL OMEGA-3 PO), Take by mouth daily after breakfast, Disp: , Rfl:      Allergies     Allergies   Allergen Reactions    Shellfish-Derived Products - Food Allergy Anaphylaxis       Objective     Recent Results (from the past 4 weeks)   POCT rapid ANTIGEN strepA    Collection Time: 12/31/24  2:21 PM   Result Value Ref Range     RAPID STREP A Positive (A) Negative   Comprehensive metabolic panel    Collection Time:  01/07/25 11:10 AM   Result Value Ref Range    Glucose, Random 92 70 - 99 mg/dL    BUN 19 6 - 20 mg/dL    Creatinine 1.05 0.76 - 1.27 mg/dL    eGFR 99 >59 mL/min/1.73    SL AMB BUN/CREATININE RATIO 18 9 - 20    Sodium 138 134 - 144 mmol/L    Potassium 5.1 3.5 - 5.2 mmol/L    Chloride 97 96 - 106 mmol/L    CO2 21 20 - 29 mmol/L    CALCIUM 10.2 8.7 - 10.2 mg/dL    Protein, Total 8.0 6.0 - 8.5 g/dL    Albumin 4.9 4.3 - 5.2 g/dL    Globulin, Total 3.1 1.5 - 4.5 g/dL    TOTAL BILIRUBIN 0.8 0.0 - 1.2 mg/dL    Alk Phos Isoenzymes 99 44 - 121 IU/L    AST 79 (H) 0 - 40 IU/L     (H) 0 - 44 IU/L   CBC and differential    Collection Time: 01/07/25 11:10 AM   Result Value Ref Range    White Blood Cell Count 8.0 3.4 - 10.8 x10E3/uL    Red Blood Cell Count 5.69 4.14 - 5.80 x10E6/uL    Hemoglobin 17.1 13.0 - 17.7 g/dL    HCT 50.9 37.5 - 51.0 %    MCV 90 79 - 97 fL    MCH 30.1 26.6 - 33.0 pg    MCHC 33.6 31.5 - 35.7 g/dL    RDW 12.9 11.6 - 15.4 %    Platelet Count 285 150 - 450 x10E3/uL    Neutrophils 57 Not Estab. %    Lymphocytes 30 Not Estab. %    Monocytes 8 Not Estab. %    Eosinophils 3 Not Estab. %    Basophils PCT 1 Not Estab. %    Neutrophils (Absolute) 4.6 1.4 - 7.0 x10E3/uL    Lymphocytes (Absolute) 2.4 0.7 - 3.1 x10E3/uL    Monocytes (Absolute) 0.6 0.1 - 0.9 x10E3/uL    Eosinophils (Absolute) 0.2 0.0 - 0.4 x10E3/uL    Basophils ABS 0.0 0.0 - 0.2 x10E3/uL    Immature Granulocytes 1 Not Estab. %    Immature Granulocytes (Absolute) 0.1 0.0 - 0.1 x10E3/uL   Hemoglobin A1C    Collection Time: 01/07/25 11:10 AM   Result Value Ref Range    Hemoglobin A1C 5.7 (H) 4.8 - 5.6 %    Estimated Average Glucose 117 mg/dL   Lipid panel    Collection Time: 01/07/25 11:10 AM   Result Value Ref Range    Cholesterol, Total 219 (H) 100 - 199 mg/dL    Triglycerides 144 0 - 149 mg/dL    HDL 45 >39 mg/dL    VLDL Cholesterol Calculated 26 5 - 40 mg/dL    LDL Calculated 148 (H) 0 - 99 mg/dL    T. Chol/HDL Ratio 4.9 0.0 - 5.0 ratio   Hepatitis  "C antibody    Collection Time: 01/07/25 11:10 AM   Result Value Ref Range    HEP C AB Non Reactive Non Reactive   HIV 1/2 AG/AB W REFLEX [facility] and [facility] only    Collection Time: 01/07/25 11:10 AM   Result Value Ref Range    HIV Screen 4th Generation wRflx Non Reactive Non Reactive   TSH, 3rd generation    Collection Time: 01/07/25 11:10 AM   Result Value Ref Range    TSH 2.550 0.450 - 4.500 uIU/mL   Lyme Disease Serology w/Reflex    Collection Time: 01/07/25 11:10 AM   Result Value Ref Range    LYME TOTAL ANTIBODY EIA Negative Negative     Xray right knee 1/7/2025  FINDINGS:   There is no acute fracture or dislocation.   There is no joint effusion.   No significant degenerative changes.   No lytic or blastic osseous lesion.   Soft tissues are unremarkable.   IMPRESSION:   No acute osseous abnormality.    /82   Pulse 100   Temp 97.6 °F (36.4 °C)   Resp 18   Ht 5' 11\" (1.803 m)   Wt 116 kg (255 lb 12.8 oz)   SpO2 95%   BMI 35.68 kg/m²      Physical Exam  Vitals reviewed.   Constitutional:       General: He is not in acute distress.     Appearance: Normal appearance. He is well-developed. He is not ill-appearing.   HENT:      Head: Normocephalic and atraumatic.      Right Ear: Tympanic membrane and ear canal normal.      Left Ear: Tympanic membrane and ear canal normal.      Nose: Nose normal.      Mouth/Throat:      Mouth: Mucous membranes are moist.      Pharynx: Oropharynx is clear.   Eyes:      General: No scleral icterus.     Extraocular Movements: Extraocular movements intact.      Pupils: Pupils are equal, round, and reactive to light.   Neck:      Thyroid: No thyromegaly.      Vascular: No carotid bruit.   Cardiovascular:      Rate and Rhythm: Normal rate and regular rhythm.      Heart sounds: No murmur heard.  Pulmonary:      Effort: Pulmonary effort is normal. No respiratory distress.      Breath sounds: Normal breath sounds. No wheezing or rales.   Abdominal:      General: Bowel sounds are " normal. There is no distension.      Palpations: Abdomen is soft.      Tenderness: There is no abdominal tenderness.   Musculoskeletal:         General: Normal range of motion.      Cervical back: Normal range of motion and neck supple.      Right lower leg: No edema.      Left lower leg: No edema.   Lymphadenopathy:      Cervical: No cervical adenopathy.   Skin:     General: Skin is warm and dry.      Coloration: Skin is not jaundiced or pale.   Neurological:      General: No focal deficit present.      Mental Status: He is alert and oriented to person, place, and time.      Cranial Nerves: No cranial nerve deficit.      Sensory: No sensory deficit.      Coordination: Coordination normal.      Gait: Gait normal.   Psychiatric:         Mood and Affect: Mood normal.         Behavior: Behavior normal.         Thought Content: Thought content normal.         Judgment: Judgment normal.           Vision Screening    Right eye Left eye Both eyes   Without correction 20/20 20/25 20/15   With correction              MITZI Tinajero  [facility]

## 2025-01-29 VITALS — BODY MASS INDEX: 35.7 KG/M2 | HEIGHT: 71 IN | WEIGHT: 255 LBS

## 2025-01-29 DIAGNOSIS — R22.41 KNEE MASS, RIGHT: ICD-10-CM

## 2025-01-29 PROCEDURE — 99243 OFF/OP CNSLTJ NEW/EST LOW 30: CPT | Performed by: ORTHOPAEDIC SURGERY

## 2025-01-29 NOTE — PROGRESS NOTES
Assessment/Plan:  1. Knee mass, right  Ambulatory Referral to Orthopedic Surgery    US extremity soft tissue          Junior has right-sided knee pain with discomfort over the anterior patella with a palpable mobile slightly tender mass directly over the soft tissue of the patella.  This is most likely a traumatic hematoma that has scarred and lead to a palpable mass.  I discussed the possibility of presence of a foreign body since he does kneel over his patella however this would be unlikely given the size.  I recommended ultrasound for identification of foreign body versus mass.  If this becomes consistent and does not resolve with ice and compression over the next few months then there could be surgical discussion for removal.  He was agreeable to this plan.  I will call him with results of the ultrasound.    Subjective:   Junior Clements is a 30 y.o. male who presents to the office for evaluation for right knee discomfort.  He was sent by his PCP office.  He has a history of a fall onto his right knee 1 year ago resulting in a lot of anterior swelling and pain.  The swelling resolved and he now feels a tender mobile lump over the anterior patella.  It does not bother him with walking but does bother him when he kneels directly on the knee.  He works as a  and this is very uncomfortable for him as he constantly is kneeling onto the patellar region.  He feels like the pain has not been subsiding over the last year and he thought it would just simply go away.  He denies any locking or catching or mechanical symptoms to the knee.  He denies any recurrent effusions or instability.      Review of Systems   Constitutional:  Negative for chills, fever and unexpected weight change.   HENT:  Negative for hearing loss, nosebleeds and sore throat.    Eyes:  Negative for pain, redness and visual disturbance.   Respiratory:  Negative for cough, shortness of breath and wheezing.    Cardiovascular:  Negative for chest pain,  palpitations and leg swelling.   Gastrointestinal:  Negative for abdominal pain, nausea and vomiting.   Endocrine: Negative for polyphagia and polyuria.   Genitourinary:  Negative for dysuria and hematuria.   Musculoskeletal:         See HPI   Skin:  Negative for rash and wound.   Neurological:  Negative for dizziness, numbness and headaches.   Psychiatric/Behavioral:  Negative for decreased concentration and suicidal ideas. The patient is not nervous/anxious.          Past Medical History:   Diagnosis Date    CPAP (continuous positive airway pressure) dependence     Sleep apnea     Umbilical hernia without obstruction or gangrene 12/01/2023       Past Surgical History:   Procedure Laterality Date    FRACTURE SURGERY Left     hand    NH RPR AA HERNIA 1ST < 3 CM REDUCIBLE N/A 12/1/2023    Procedure: REPAIR HERNIA UMBILICAL;  Surgeon: Severino Vasquez MD;  Location: [facility];  Service: General       History reviewed. No pertinent family history.    Social History     Occupational History    Not on file   Tobacco Use    Smoking status: Never     Passive exposure: Never    Smokeless tobacco: Never   Vaping Use    Vaping status: Some Days    Substances: Nicotine, Flavoring   Substance and Sexual Activity    Alcohol use: Never    Drug use: Never    Sexual activity: Not on file         Current Outpatient Medications:     Multiple Vitamin (MULTIVITAMIN ADULT PO), Take by mouth daily after breakfast, Disp: , Rfl:     Omega-3 Fatty Acids (FISH OIL OMEGA-3 PO), Take by mouth daily after breakfast, Disp: , Rfl:     Allergies   Allergen Reactions    Shellfish-Derived Products - Food Allergy Anaphylaxis       Objective:  There were no vitals filed for this visit.         Right Knee Exam     Tenderness   Right knee tenderness location: Slight tenderness over hard, mobile mass anterior to the patella.    Range of Motion   Extension:  normal   Flexion:  normal     Tests   Anisa:  Medial - negative Lateral - negative  Varus:  negative Valgus: negative    Other   Erythema: absent  Sensation: normal  Pulse: present  Effusion: no effusion present          Observations     Right Knee   Negative for effusion.       Physical Exam  Vitals reviewed.   Constitutional:       Appearance: He is well-developed.   HENT:      Head: Normocephalic and atraumatic.   Eyes:      Conjunctiva/sclera: Conjunctivae normal.      Pupils: Pupils are equal, round, and reactive to light.   Cardiovascular:      Rate and Rhythm: Normal rate.      Pulses: Normal pulses.   Pulmonary:      Effort: Pulmonary effort is normal. No respiratory distress.   Musculoskeletal:      Cervical back: Normal range of motion and neck supple.      Right knee: No effusion.      Instability Tests: Medial Anisa test negative and lateral Anisa test negative.      Comments: As noted in HPI   Skin:     General: Skin is warm and dry.   Neurological:      General: No focal deficit present.      Mental Status: He is alert and oriented to person, place, and time.   Psychiatric:         Mood and Affect: Mood normal.         Behavior: Behavior normal.         I have personally reviewed pertinent films in PACS and my interpretation is as follows:  X-ray of the right knee from 1/7/2025 demonstrates no acute abnormality.  No visible mass over the soft tissue anterior to the patella      This document was created using speech voice recognition software.   Grammatical errors, random word insertions, pronoun errors, and incomplete sentences are an occasional consequence of this system due to software limitations, ambient noise, and hardware issues.   Any formal questions or concerns about content, text, or information contained within the body of this dictation should be directly addressed to the provider for clarification.

## 2025-01-29 NOTE — LETTER
January 29, 2025     MITZI Tinajero  [address] [place] [address]  [address]  [facility] [address]    Patient: Junior Clements   YOB: 1995   Date of Visit: 1/29/2025         Thank you for referring Junior Clements to me for evaluation. Below are my notes for this consultation.    If you have questions, please do not hesitate to call me. I look forward to following your patient along with you.         Sincerely,        Wilian Williamson DO        CC: No Recipients    Wilian Williamson DO  1/29/2025  8:42 AM  Sign when Signing Visit  Assessment/Plan:  1. Knee mass, right  Ambulatory Referral to Orthopedic Surgery    US extremity soft tissue          Junior has right-sided knee pain with discomfort over the anterior patella with a palpable mobile slightly tender mass directly over the soft tissue of the patella.  This is most likely a traumatic hematoma that has scarred and lead to a palpable mass.  I discussed the possibility of presence of a foreign body since he does kneel over his patella however this would be unlikely given the size.  I recommended ultrasound for identification of foreign body versus mass.  If this becomes consistent and does not resolve with ice and compression over the next few months then there could be surgical discussion for removal.  He was agreeable to this plan.  I will call him with results of the ultrasound.    Subjective:   Junior Clements is a 30 y.o. male who presents to the office for evaluation for right knee discomfort.  He was sent by his PCP office.  He has a history of a fall onto his right knee 1 year ago resulting in a lot of anterior swelling and pain.  The swelling resolved and he now feels a tender mobile lump over the anterior patella.  It does not bother him with walking but does bother him when he kneels directly on the knee.  He works as a  and this is very uncomfortable for him as he constantly is kneeling onto the patellar region.  He feels like the pain has not been  subsiding over the last year and he thought it would just simply go away.  He denies any locking or catching or mechanical symptoms to the knee.  He denies any recurrent effusions or instability.      Review of Systems   Constitutional:  Negative for chills, fever and unexpected weight change.   HENT:  Negative for hearing loss, nosebleeds and sore throat.    Eyes:  Negative for pain, redness and visual disturbance.   Respiratory:  Negative for cough, shortness of breath and wheezing.    Cardiovascular:  Negative for chest pain, palpitations and leg swelling.   Gastrointestinal:  Negative for abdominal pain, nausea and vomiting.   Endocrine: Negative for polyphagia and polyuria.   Genitourinary:  Negative for dysuria and hematuria.   Musculoskeletal:         See HPI   Skin:  Negative for rash and wound.   Neurological:  Negative for dizziness, numbness and headaches.   Psychiatric/Behavioral:  Negative for decreased concentration and suicidal ideas. The patient is not nervous/anxious.          Past Medical History:   Diagnosis Date   • CPAP (continuous positive airway pressure) dependence    • Sleep apnea    • Umbilical hernia without obstruction or gangrene 12/01/2023       Past Surgical History:   Procedure Laterality Date   • FRACTURE SURGERY Left     hand   • [place] RPR AA HERNIA 1ST < 3 CM REDUCIBLE N/A 12/1/2023    Procedure: REPAIR HERNIA UMBILICAL;  Surgeon: Severino Vasquez MD;  Location: [facility];  Service: General       History reviewed. No pertinent family history.    Social History     Occupational History   • Not on file   Tobacco Use   • Smoking status: Never     Passive exposure: Never   • Smokeless tobacco: Never   Vaping Use   • Vaping status: Some Days   • Substances: Nicotine, Flavoring   Substance and Sexual Activity   • Alcohol use: Never   • Drug use: Never   • Sexual activity: Not on file         Current Outpatient Medications:   •  Multiple Vitamin (MULTIVITAMIN ADULT PO), Take by mouth daily  after breakfast, Disp: , Rfl:   •  Omega-3 Fatty Acids (FISH OIL OMEGA-3 PO), Take by mouth daily after breakfast, Disp: , Rfl:     Allergies   Allergen Reactions   • Shellfish-Derived Products - Food Allergy Anaphylaxis       Objective:  There were no vitals filed for this visit.         Right Knee Exam     Tenderness   Right knee tenderness location: Slight tenderness over hard, mobile mass anterior to the patella.    Range of Motion   Extension:  normal   Flexion:  normal     Tests   Anisa:  Medial - negative Lateral - negative  Varus: negative Valgus: negative    Other   Erythema: absent  Sensation: normal  Pulse: present  Effusion: no effusion present          Observations     Right Knee   Negative for effusion.       Physical Exam  Vitals reviewed.   Constitutional:       Appearance: He is well-developed.   HENT:      Head: Normocephalic and atraumatic.   Eyes:      Conjunctiva/sclera: Conjunctivae normal.      Pupils: Pupils are equal, round, and reactive to light.   Cardiovascular:      Rate and Rhythm: Normal rate.      Pulses: Normal pulses.   Pulmonary:      Effort: Pulmonary effort is normal. No respiratory distress.   Musculoskeletal:      Cervical back: Normal range of motion and neck supple.      Right knee: No effusion.      Instability Tests: Medial Anisa test negative and lateral Anisa test negative.      Comments: As noted in HPI   Skin:     General: Skin is warm and dry.   Neurological:      General: No focal deficit present.      Mental Status: He is alert and oriented to person, place, and time.   Psychiatric:         Mood and Affect: Mood normal.         Behavior: Behavior normal.         I have personally reviewed pertinent films in PACS and my interpretation is as follows:  X-ray of the right knee from 1/7/2025 demonstrates no acute abnormality.  No visible mass over the soft tissue anterior to the patella      This document was created using speech voice recognition software.    Grammatical errors, random word insertions, pronoun errors, and incomplete sentences are an occasional consequence of this system due to software limitations, ambient noise, and hardware issues.   Any formal questions or concerns about content, text, or information contained within the body of this dictation should be directly addressed to the provider for clarification.

## 2025-02-05 ENCOUNTER — HOSPITAL ENCOUNTER (OUTPATIENT)
Dept: RADIOLOGY | Facility: HOSPITAL | Age: 30
Discharge: HOME | End: 2025-02-05
Attending: ORTHOPAEDIC SURGERY
Payer: COMMERCIAL

## 2025-02-05 DIAGNOSIS — R22.41 KNEE MASS, RIGHT: ICD-10-CM

## 2025-02-05 PROCEDURE — 76882 US LMTD JT/FCL EVL NVASC XTR: CPT

## 2025-02-06 ENCOUNTER — HOSPITAL ENCOUNTER (OUTPATIENT)
Dept: RADIOLOGY | Facility: HOSPITAL | Age: 30
Discharge: HOME | End: 2025-02-06
Payer: COMMERCIAL

## 2025-02-06 DIAGNOSIS — R79.89 ELEVATED LFTS: ICD-10-CM

## 2025-02-06 PROCEDURE — 76705 ECHO EXAM OF ABDOMEN: CPT

## 2025-02-10 NOTE — TELEPHONE ENCOUNTER
Pinky pt and advised   ----- Message from Wilian Williamson DO sent at 2/10/2025  2:16 PM EST -----  Junior had an ultrasound of his right knee.  They did not see any clear sign of a mass or fluid collection.  With the area of concern.  It should hopefully resolve on its own.  If he has persistent or worsening symptoms then we could consider other imaging.  He can follow-up with me only if needed.

## 2025-02-11 ENCOUNTER — RESULTS FOLLOW-UP (OUTPATIENT)
Dept: FAMILY MEDICINE CLINIC | Facility: CLINIC | Age: 30
End: 2025-02-11

## 2025-06-06 ENCOUNTER — OFFICE VISIT (OUTPATIENT)
Dept: FAMILY MEDICINE CLINIC | Facility: CLINIC | Age: 30
End: 2025-06-06
Payer: COMMERCIAL

## 2025-06-06 VITALS
HEART RATE: 85 BPM | OXYGEN SATURATION: 96 % | WEIGHT: 253 LBS | DIASTOLIC BLOOD PRESSURE: 86 MMHG | HEIGHT: 71 IN | RESPIRATION RATE: 14 BRPM | TEMPERATURE: 96.2 F | BODY MASS INDEX: 35.42 KG/M2 | SYSTOLIC BLOOD PRESSURE: 132 MMHG

## 2025-06-06 DIAGNOSIS — R19.7 DIARRHEA, UNSPECIFIED TYPE: Primary | ICD-10-CM

## 2025-06-06 PROBLEM — R79.89 ELEVATED LFTS: Status: ACTIVE | Noted: 2025-06-06

## 2025-06-06 PROBLEM — K76.0 FATTY LIVER: Status: ACTIVE | Noted: 2025-06-06

## 2025-06-06 PROCEDURE — 99214 OFFICE O/P EST MOD 30 MIN: CPT | Performed by: NURSE PRACTITIONER

## 2025-06-06 NOTE — PROGRESS NOTES
"Name: Junior Clements      : 1995      MRN: [id number]  Encounter Provider: MITZI Tinajero  Encounter Date: 2025   Encounter department: [facility]  :  Assessment & Plan  Diarrhea, unspecified type  Will check labs.   Stool sample to be brought to lab as discussed  Follow the BRAT (bananas, rice, applesauce, tea, toast) diet until symptoms resolve. This may take several days.   Avoid all dairy products until symptoms resolve and for 5 days afterwards.   Call or return to office if symptoms worsen or if new symptoms develop.   Orders:    CBC and differential; Future    Comprehensive metabolic panel; Future    Stool Enteric Bacterial Panel by PCR; Future           History of Present Illness   Here for sick visit  Diarrhea, 6-7 x per day.   Started 6 days ago  Feels dehydrated.   No fever. No abd pain. No n/v  No recent travel. No change in diet. No sick contacts. No recent antibiotics    Diarrhea   Pertinent negatives include no abdominal pain, arthralgias, fever, myalgias or vomiting.     Review of Systems   Constitutional:  Negative for fever.   Gastrointestinal:  Positive for diarrhea. Negative for abdominal pain, nausea and vomiting.   Musculoskeletal:  Negative for arthralgias and myalgias.   Skin:  Negative for color change and pallor.   Allergic/Immunologic: Negative for immunocompromised state.   Neurological:  Negative for dizziness and light-headedness.       Objective   /86 (BP Location: Right arm, Patient Position: Sitting, Cuff Size: Large)   Pulse 85   Temp (!) 96.2 °F (35.7 °C) (Tympanic)   Resp 14   Ht 5' 11\" (1.803 m)   Wt 115 kg (253 lb)   SpO2 96%   BMI 35.29 kg/m²      Physical Exam  Vitals reviewed.   Constitutional:       General: He is not in acute distress.  HENT:      Mouth/Throat:      Mouth: Mucous membranes are moist.     Cardiovascular:      Rate and Rhythm: Normal rate and regular rhythm.   Pulmonary:      Effort: Pulmonary effort is " normal.      Breath sounds: Normal breath sounds.   Abdominal:      General: There is no distension.      Palpations: Abdomen is soft.      Tenderness: There is no abdominal tenderness. There is no guarding or rebound.     Skin:     General: Skin is warm and dry.      Coloration: Skin is not jaundiced or pale.     Neurological:      General: No focal deficit present.      Mental Status: He is alert and oriented to person, place, and time.      Cranial Nerves: No cranial nerve deficit.      Sensory: No sensory deficit.     Psychiatric:         Mood and Affect: Mood normal.         Behavior: Behavior normal.         Thought Content: Thought content normal.         Judgment: Judgment normal.

## 2025-06-06 NOTE — PATIENT INSTRUCTIONS
Will check labs.   Stool sample to be brought to lab as discussed  Follow the BRAT (bananas, rice, applesauce, tea, toast) diet until symptoms resolve. This may take several days.   Avoid all dairy products until symptoms resolve and for 5 days afterwards.   Call or return to office if symptoms worsen or if new symptoms develop.

## (undated) DEVICE — CHLORAPREP HI-LITE 26ML ORANGE

## (undated) DEVICE — GLOVE INDICATOR PI UNDERGLOVE SZ 7.5 BLUE

## (undated) DEVICE — NEPTUNE E-SEP SMOKE EVACUATION PENCIL, COATED, 70MM BLADE, PUSH BUTTON SWITCH: Brand: NEPTUNE E-SEP

## (undated) DEVICE — SUT VICRYL 3-0 SH 27 IN J416H

## (undated) DEVICE — ADHESIVE SKIN HIGH VISCOSITY EXOFIN 1ML

## (undated) DEVICE — ASTOUND STANDARD SURGICAL GOWN, XL: Brand: CONVERTORS

## (undated) DEVICE — DRAPE LAPAROTOMY W/POUCHES

## (undated) DEVICE — TOWEL SURG XR DETECT GREEN STRL RFD

## (undated) DEVICE — DRAPE UTILITY

## (undated) DEVICE — PACK GENERAL LF

## (undated) DEVICE — BASIC DOUBLE BASIN 2-LF: Brand: MEDLINE INDUSTRIES, INC.

## (undated) DEVICE — SUT ETHIBOND 0 CT-2 30 IN X412H

## (undated) DEVICE — SUT MONOCRYL 4-0 PS-2 27 IN Y426H

## (undated) DEVICE — GLOVE SRG BIOGEL 8